# Patient Record
Sex: FEMALE | Race: WHITE | Employment: OTHER | URBAN - NONMETROPOLITAN AREA
[De-identification: names, ages, dates, MRNs, and addresses within clinical notes are randomized per-mention and may not be internally consistent; named-entity substitution may affect disease eponyms.]

---

## 2018-03-01 ENCOUNTER — HOSPITAL ENCOUNTER (OUTPATIENT)
Dept: WOMENS IMAGING | Age: 73
Discharge: HOME | End: 2018-03-01
Payer: MEDICARE

## 2018-03-01 DIAGNOSIS — Z12.31 ENCOUNTER FOR SCREENING MAMMOGRAM FOR MALIGNANT NEOPLASM OF BREAST: ICD-10-CM

## 2018-03-01 DIAGNOSIS — Z12.31 VISIT FOR SCREENING MAMMOGRAM: ICD-10-CM

## 2018-03-01 PROCEDURE — 77063 BREAST TOMOSYNTHESIS BI: CPT

## 2019-03-01 ENCOUNTER — HOSPITAL ENCOUNTER (OUTPATIENT)
Dept: WOMENS IMAGING | Age: 74
Discharge: HOME | End: 2019-03-01
Payer: MEDICARE

## 2019-03-01 DIAGNOSIS — Z12.31 VISIT FOR SCREENING MAMMOGRAM: ICD-10-CM

## 2019-03-01 PROCEDURE — 77063 BREAST TOMOSYNTHESIS BI: CPT

## 2020-03-05 ENCOUNTER — HOSPITAL ENCOUNTER (OUTPATIENT)
Dept: WOMENS IMAGING | Age: 75
Discharge: HOME | End: 2020-03-05
Payer: MEDICARE

## 2020-03-05 PROCEDURE — 77063 BREAST TOMOSYNTHESIS BI: CPT

## 2021-03-08 ENCOUNTER — HOSPITAL ENCOUNTER (OUTPATIENT)
Dept: WOMENS IMAGING | Age: 76
Discharge: HOME | End: 2021-03-08
Payer: MEDICARE

## 2021-03-08 DIAGNOSIS — Z12.31 VISIT FOR SCREENING MAMMOGRAM: ICD-10-CM

## 2021-03-08 PROCEDURE — 77063 BREAST TOMOSYNTHESIS BI: CPT

## 2022-04-18 ENCOUNTER — HOSPITAL ENCOUNTER (OUTPATIENT)
Dept: WOMENS IMAGING | Age: 77
Discharge: HOME | End: 2022-04-18
Payer: MEDICARE

## 2022-04-18 DIAGNOSIS — Z12.31 VISIT FOR SCREENING MAMMOGRAM: ICD-10-CM

## 2022-04-18 PROCEDURE — 77063 BREAST TOMOSYNTHESIS BI: CPT

## 2022-04-20 ENCOUNTER — HOSPITAL ENCOUNTER (OUTPATIENT)
Dept: WOMENS IMAGING | Age: 77
Discharge: HOME | End: 2022-04-20
Payer: MEDICARE

## 2022-04-20 DIAGNOSIS — R92.0 MICROCALCIFICATION OF LEFT BREAST ON MAMMOGRAM: ICD-10-CM

## 2022-04-20 PROCEDURE — G0279 TOMOSYNTHESIS, MAMMO: HCPCS

## 2022-10-24 ENCOUNTER — HOSPITAL ENCOUNTER (OUTPATIENT)
Dept: WOMENS IMAGING | Age: 77
Discharge: HOME | End: 2022-10-24
Payer: MEDICARE

## 2022-10-24 DIAGNOSIS — Z09 FOLLOW-UP EXAM: ICD-10-CM

## 2022-10-24 DIAGNOSIS — R92.0 MICROCALCIFICATION OF LEFT BREAST ON MAMMOGRAM: ICD-10-CM

## 2022-10-24 PROCEDURE — G0279 TOMOSYNTHESIS, MAMMO: HCPCS

## 2022-11-01 ENCOUNTER — HOSPITAL ENCOUNTER (OUTPATIENT)
Dept: WOMENS IMAGING | Age: 77
Discharge: HOME | End: 2022-11-01
Payer: MEDICARE

## 2022-11-01 DIAGNOSIS — R92.1 BREAST CALCIFICATION, LEFT: ICD-10-CM

## 2022-11-01 PROCEDURE — 88342 IMHCHEM/IMCYTCHM 1ST ANTB: CPT

## 2022-11-01 PROCEDURE — A4648 IMPLANTABLE TISSUE MARKER: HCPCS

## 2022-11-01 PROCEDURE — 88305 TISSUE EXAM BY PATHOLOGIST: CPT

## 2022-11-01 PROCEDURE — G0279 TOMOSYNTHESIS, MAMMO: HCPCS

## 2022-11-01 PROCEDURE — 88360 TUMOR IMMUNOHISTOCHEM/MANUAL: CPT

## 2022-11-01 NOTE — PROGRESS NOTES
Breast Biopsy Flowsheet/Post-Operative Care    Date of Procedure: 11/1/2022  Physician: Dr. Amandeep Patel  Technologist: Lorena Bolanos RT(R)(M)    Biopsy:stereotactic breast biopsy  Lesion type: Non-palpable  Breast: left    Clock face position: Site #1: UOQ mid depth          Primary Method of Detection: Mammogram      Microcalcification's: yes,Increasing Number   Distribution: Grouped       Biopsy Method:   Mammotome:    Site # 1    Gauge: 10    # of Passes: 8     Clip: Tribell       Pre-Op Assessment: (BI-RADS)   4. Suspicious Abnormality    Patient Tolerated Procedure: good  Complications: none  Comments: she did  have more than normal bleeding during and immediately after the procedure. Firm pressure with ice was held for 10 minutes, post procedure. I could see some discoloration starting.     Post Operative Care  Steri strips: Yes  Dressing: Gauze, Tape   Ice Applied to Site:  Yes  Evidence of Bleeding:  No    Pain Verbalized: No      Written Discharge Instructions: Yes  Condition at Discharge: good  Time of Discharge: [phone]    Electronically signed by Nicole Vargas on 11/1/2022 at 11:16 AM

## 2022-11-04 ENCOUNTER — TELEPHONE (OUTPATIENT)
Dept: ONCOLOGY | Age: 77
End: 2022-11-04

## 2022-11-04 ENCOUNTER — CLINICAL DOCUMENTATION (OUTPATIENT)
Dept: WOMENS IMAGING | Age: 77
End: 2022-11-04

## 2022-11-04 NOTE — PROGRESS NOTES
Contact Type: [facility]    Diagnosis:  DCIS    Home Disposition: Lives with her     Contact Information: Patient saw her results on [employer], after discussing with Dr. Samara Malik, I called the patient and we went over results. Patient Expresses Need(s) For: patient is aware of what she read, she states she has been through this before and will talk to Dr. Blane Reyes. Requests Referral to Doctor(s) with appointment(s): the patient called back later on Thursday after Dr. Blane Reyes had called her and requested an appointment with Dr. Michelle Ashton. Referral call was made to Rosario at the office and they scheduled Lori for next week with Dr. Michelle Ashton    Additional Referral(s): Sharyle Couch Gerding/Lisa Eddy: [facility],        Integrated breast cancer clinic appointment: n/a    Biopsy site status: bruised and sore    Teaching Sheets provided:  n/a    Currently on HRT: no    If yes, was patient instructed to stop immediately: N/A     Notes:Patient aware that oncology navigators will be calling her.     Answered yes on Genetic Risk Assessment: no     Additional information: established with Dr. Blane Reyes and Dr. Michelle Ashton    Results Faxed to:  Jaison Blunt, Dr. Blane Reyes

## 2022-11-04 NOTE — TELEPHONE ENCOUNTER
Name: Azucena Castro  : 1945  MRN: [id number]    Oncology Navigation Follow-Up Note    Contact Type:  Telephone    Notes: Called patient to discuss left breast cancer diagnosis. Left message and contact information for patient to call me.       Electronically signed by Brendon Talavera RN on 2022 at 1:23 PM

## 2022-11-07 ENCOUNTER — OFFICE VISIT (OUTPATIENT)
Dept: SURGERY | Age: 77
End: 2022-11-07
Payer: MEDICARE

## 2022-11-07 ENCOUNTER — CLINICAL DOCUMENTATION (OUTPATIENT)
Dept: CASE MANAGEMENT | Age: 77
End: 2022-11-07

## 2022-11-07 ENCOUNTER — HOSPITAL ENCOUNTER (OUTPATIENT)
Age: 77
Discharge: HOME | End: 2022-11-07
Payer: MEDICARE

## 2022-11-07 ENCOUNTER — HOSPITAL ENCOUNTER (OUTPATIENT)
Age: 77
Discharge: HOME | End: 2022-11-07

## 2022-11-07 VITALS
HEART RATE: 67 BPM | SYSTOLIC BLOOD PRESSURE: 122 MMHG | WEIGHT: 147 LBS | RESPIRATION RATE: 18 BRPM | TEMPERATURE: 96.8 F | HEIGHT: 68 IN | OXYGEN SATURATION: 98 % | BODY MASS INDEX: 22.28 KG/M2 | DIASTOLIC BLOOD PRESSURE: 70 MMHG

## 2022-11-07 DIAGNOSIS — G47.33 OBSTRUCTIVE SLEEP APNEA SYNDROME: ICD-10-CM

## 2022-11-07 DIAGNOSIS — I10 ESSENTIAL HYPERTENSION: ICD-10-CM

## 2022-11-07 DIAGNOSIS — M85.80 OSTEOPENIA, UNSPECIFIED LOCATION: ICD-10-CM

## 2022-11-07 DIAGNOSIS — D05.12 DUCTAL CARCINOMA IN SITU (DCIS) OF LEFT BREAST: Primary | ICD-10-CM

## 2022-11-07 DIAGNOSIS — Z01.818 PRE-OP TESTING: ICD-10-CM

## 2022-11-07 DIAGNOSIS — D05.12 DUCTAL CARCINOMA IN SITU (DCIS) OF LEFT BREAST: ICD-10-CM

## 2022-11-07 LAB
ALBUMIN SERPL-MCNC: 4.4 G/DL (ref 3.5–5.1)
ALP BLD-CCNC: 83 U/L (ref 38–126)
ALT SERPL-CCNC: 15 U/L (ref 11–66)
ANION GAP SERPL CALCULATED.3IONS-SCNC: 8 MEQ/L (ref 8–16)
AST SERPL-CCNC: 19 U/L (ref 5–40)
BILIRUB SERPL-MCNC: 0.4 MG/DL (ref 0.3–1.2)
BUN BLDV-MCNC: 9 MG/DL (ref 7–22)
CALCIUM SERPL-MCNC: 9.8 MG/DL (ref 8.5–10.5)
CHLORIDE BLD-SCNC: 98 MEQ/L (ref 98–111)
CO2: 29 MEQ/L (ref 23–33)
CREAT SERPL-MCNC: 0.6 MG/DL (ref 0.4–1.2)
ERYTHROCYTE [DISTWIDTH] IN BLOOD BY AUTOMATED COUNT: 12.6 % (ref 11.5–14.5)
ERYTHROCYTE [DISTWIDTH] IN BLOOD BY AUTOMATED COUNT: 43.9 FL (ref 35–45)
GFR SERPL CREATININE-BSD FRML MDRD: > 60 ML/MIN/1.73M2
GLUCOSE BLD-MCNC: 99 MG/DL (ref 70–108)
HCT VFR BLD CALC: 42.2 % (ref 37–47)
HEMOGLOBIN: 14 GM/DL (ref 12–16)
MCH RBC QN AUTO: 31.5 PG (ref 26–33)
MCHC RBC AUTO-ENTMCNC: 33.2 GM/DL (ref 32.2–35.5)
MCV RBC AUTO: 95 FL (ref 81–99)
PLATELET # BLD: 292 THOU/MM3 (ref 130–400)
PMV BLD AUTO: 11.1 FL (ref 9.4–12.4)
POTASSIUM SERPL-SCNC: 3.7 MEQ/L (ref 3.5–5.2)
RBC # BLD: 4.44 MILL/MM3 (ref 4.2–5.4)
SODIUM BLD-SCNC: 135 MEQ/L (ref 135–145)
TOTAL PROTEIN: 6.8 G/DL (ref 6.1–8)
WBC # BLD: 5.4 THOU/MM3 (ref 4.8–10.8)

## 2022-11-07 PROCEDURE — 1090F PRES/ABSN URINE INCON ASSESS: CPT | Performed by: SURGERY

## 2022-11-07 PROCEDURE — G8427 DOCREV CUR MEDS BY ELIG CLIN: HCPCS | Performed by: SURGERY

## 2022-11-07 PROCEDURE — 3074F SYST BP LT 130 MM HG: CPT | Performed by: SURGERY

## 2022-11-07 PROCEDURE — 3078F DIAST BP <80 MM HG: CPT | Performed by: SURGERY

## 2022-11-07 PROCEDURE — 99204 OFFICE O/P NEW MOD 45 MIN: CPT | Performed by: SURGERY

## 2022-11-07 PROCEDURE — G8420 CALC BMI NORM PARAMETERS: HCPCS | Performed by: SURGERY

## 2022-11-07 PROCEDURE — 1036F TOBACCO NON-USER: CPT | Performed by: SURGERY

## 2022-11-07 PROCEDURE — G8400 PT W/DXA NO RESULTS DOC: HCPCS | Performed by: SURGERY

## 2022-11-07 PROCEDURE — 80053 COMPREHEN METABOLIC PANEL: CPT

## 2022-11-07 PROCEDURE — G8484 FLU IMMUNIZE NO ADMIN: HCPCS | Performed by: SURGERY

## 2022-11-07 PROCEDURE — 3017F COLORECTAL CA SCREEN DOC REV: CPT | Performed by: SURGERY

## 2022-11-07 PROCEDURE — 85027 COMPLETE CBC AUTOMATED: CPT

## 2022-11-07 PROCEDURE — 1123F ACP DISCUSS/DSCN MKR DOCD: CPT | Performed by: SURGERY

## 2022-11-07 PROCEDURE — 93005 ELECTROCARDIOGRAM TRACING: CPT

## 2022-11-07 PROCEDURE — 93010 ELECTROCARDIOGRAM REPORT: CPT | Performed by: NUCLEAR MEDICINE

## 2022-11-07 PROCEDURE — 36415 COLL VENOUS BLD VENIPUNCTURE: CPT

## 2022-11-07 RX ORDER — METOPROLOL SUCCINATE 25 MG/1
1 TABLET, EXTENDED RELEASE ORAL DAILY
COMMUNITY
Start: 2021-04-30

## 2022-11-07 RX ORDER — LEVOTHYROXINE SODIUM 0.05 MG/1
TABLET ORAL
COMMUNITY
Start: 2022-10-24

## 2022-11-07 ASSESSMENT — ENCOUNTER SYMPTOMS
ABDOMINAL PAIN: 0
SORE THROAT: 0
TROUBLE SWALLOWING: 0
VOICE CHANGE: 0
BLOOD IN STOOL: 0
COUGH: 0
SHORTNESS OF BREATH: 0
WHEEZING: 0
COLOR CHANGE: 0
VOMITING: 0
NAUSEA: 0

## 2022-11-07 NOTE — PROGRESS NOTES
Name: Benito Gifford  : 1945  MRN: [id number]    Oncology Navigation Follow-Up Note     Contact Type:  Medical Oncology for genetic lab draw    Notes: Blood drawn using Gordon kit and shipped by [employer]. Tracking information scanned under Media tab. [facility] Cancer Genetic Counseling Referral Form, breast cancer path report, cancer related family history, guarantor information  faxed to Sinan Berry. Patient instructed to monitor e-mail from [facility] and set up appointment for genetic counseling.

## 2022-11-07 NOTE — PROGRESS NOTES
Meryle Blade, MD   General Surgery  New Patient Evaluation in Office  Pt Name: Bibiana Romo  Date of Birth 1945   Today's Date: 11/7/2022  Medical Record Number: [id number]  Referring Provider: No ref. provider found  Primary Care Provider: Coleen Hess DO  Chief Complaint:  Chief Complaint   Patient presents with    Surgical Consult     New patient-referred by [facility] breast ductal carcinoma       ASSESSMENT      1. Ductal carcinoma in situ (DCIS) of left breast    2. Essential hypertension    3. Pre-op testing    4. Osteopenia, unspecified location    5. Obstructive sleep apnea syndrome    4. Personal history DCIS of the right breast 2011, hormone responsive     PLANS      Patient  with small focus of DCIS left breast.  Amenable to breast conservation which patient desires. She is now over 77 and may be a candidate to withhold radiation therapy. I would have her see Dr. Ruddy Allen postop to discuss recommendations. Schedule patient for wire localization lumpectomy of the left breast.  Risks of procedure discussed with patient include but not limited to bleeding, infection, need for reexcision of margins as well as recurrent disease despite treatment. Patient expressed understanding all questions answered. MAC versus general anesthesia  Patient with dense breasts. I would recommend an MRI to evaluate both breasts at some point, however, she has significant hematoma in the left breast postbiopsy. Would delay at this time. Patient wants to go ahead and schedule surgery. Recommend genetic testing and counseling. Patient states this would not change her proposed surgical intervention. She would still proceed with breast conservation therapy. Alternative approach with mastectomy with and without reconstruction discussed as well. Patient wishes to proceed with breast conservation. Patient is agreeable to genetic testing as information for her daughter's health going forward.   Medical oncology consultation. Patient sees Dr. [facility] tomorrow for his input as well. He saw him last spring. 7.  Schedule for oncology rehabilitation preoperative evaluation. 8.  Preoperative testing per anesthesia guidelines      Kati Yip is a 78y.o. year old female who is presenting today in the office for evaluation of newly diagnosed ductal carcinoma in situ of the left breast.  Regan Rios has a personal history of breast cancer with ductal carcinoma in situ of the right breast in 2011. She was treated with breast conservation therapy she did require a second surgery with reexcision of margins which were negative. She underwent a course of external beam radiation therapy postoperatively. She had 33 treatments. She was treated in [place]. She saw [facility]. [facility] and took [address] for 5 years. She had bilateral mammography 6 months ago there was a few fine calcifications in the left breast upper outer quadrant which appeared likely benign 6-month follow-up imaging was recommended. She had a few more calcifications on her 6-month imaging and underwent a stereotactic core biopsy. This revealed ductal carcinoma in situ.,  Intermediate grade which was strongly ER and IL positive. She had had no recent breast symptoms. She continues to follow-up with [facility] yearly. Menarche age 17.  G3, P3. She had her child at age 31. She took oral contraceptive medications for several years. She was menopausal in her 45s. She did not take estrogen replacement therapy except for a brief duration of Premarin. No family history of breast cancer. Father history of prostate cancer.     Past Medical History  Past Medical History:   Diagnosis Date    Ductal carcinoma in situ (DCIS) of right breast 09/15/2011    Ductal carcinoma in situ of left breast 11/01/2022    History of therapeutic radiation 2011    Hypertension     Hypothyroidism     Sleep apnea     cpap       Past Surgical History  Past Surgical History: Procedure Laterality Date    BREAST LUMPECTOMY Right 09/15/2011    DCIS    [facility] STEROTACTIC LOC BREAST BIOPSY LEFT Left 11/1/2022    LOIDA STEROTACTIC LOC BREAST BIOPSY LEFT 11/1/2022 Hira Collado MD [facility]       Medications  Current Outpatient Medications   Medication Sig Dispense Refill    metoprolol succinate (TOPROL XL) 25 MG extended release tablet Take 1 tablet by mouth daily      levothyroxine (SYNTHROID) 50 MCG tablet TAKE 1 TABLET BY MOUTH ONCE DAILY       No current facility-administered medications for this visit. Allergies   No Known Allergies    Family History  Family History   Problem Relation Age of Onset    Other Mother         fibrocystic breast    Alzheimer's Disease Mother     Prostate Cancer Father         in his 80's    Diabetes Brother     No Known Problems Maternal Grandmother     No Known Problems Maternal Grandfather     No Known Problems Paternal Grandmother     No Known Problems Paternal Grandfather        SocialHistory  Social History     Socioeconomic History    Marital status:      Spouse name: Not on file    Number of children: Not on file    Years of education: Not on file    Highest education level: Not on file   Occupational History    Not on file   Tobacco Use    Smoking status: Never    Smokeless tobacco: Never   Substance and Sexual Activity    Alcohol use: Not on file    Drug use: Not on file    Sexual activity: Not on file   Other Topics Concern    Not on file   Social History Narrative    Not on file     Social Determinants of Health     Financial Resource Strain: Not on file   Food Insecurity: Not on file   Transportation Needs: Not on file   Physical Activity: Not on file   Stress: Not on file   Social Connections: Not on file   Intimate Partner Violence: Not on file   Housing Stability: Not on file           Review of Systems  Review of Systems   Constitutional:  Negative for chills, fatigue, fever and unexpected weight change.    HENT:  Negative for sore throat, trouble swallowing and voice change. Eyes:  Negative for visual disturbance. Respiratory:  Negative for cough, shortness of breath and wheezing. Cardiovascular:  Negative for chest pain and palpitations. Gastrointestinal:  Negative for abdominal pain, blood in stool, nausea and vomiting. Endocrine: Negative for cold intolerance, heat intolerance and polydipsia. Genitourinary:  Negative for dysuria, flank pain and hematuria. Musculoskeletal:  Negative for gait problem, joint swelling and myalgias. Skin:  Negative for color change and rash. Allergic/Immunologic: Negative for immunocompromised state. Neurological:  Negative for dizziness, tremors, seizures and speech difficulty. Hematological:  Negative for adenopathy. Does not bruise/bleed easily. Psychiatric/Behavioral:  Negative for behavioral problems and confusion. OBJECTIVE     /70 (Site: Left Upper Arm, Position: Sitting, Cuff Size: Medium Adult)   Pulse 67   Temp 96.8 °F (36 °C) (Temporal)   Resp 18   Ht 5' 8\" (1.727 m)   Wt 147 lb (66.7 kg)   LMP 03/01/1996 (Approximate)   SpO2 98%   BMI 22.35 kg/m²      Physical Exam  Constitutional:       Appearance: Normal appearance. She is well-developed. HENT:      Head: Normocephalic and atraumatic. Nose: Nose normal. No congestion or rhinorrhea. Eyes:      General: No scleral icterus. Extraocular Movements: Extraocular movements intact. Pupils: Pupils are equal, round, and reactive to light. Neck:      Vascular: No JVD. Trachea: No tracheal deviation. Cardiovascular:      Rate and Rhythm: Normal rate. Heart sounds: Normal heart sounds. No murmur heard. Pulmonary:      Effort: Pulmonary effort is normal. No respiratory distress. Breath sounds: Normal breath sounds. No wheezing or rales. Chest:      Chest wall: No mass, swelling or tenderness.    Breasts:     Right: No inverted nipple, mass, nipple discharge or skin change. Left: No inverted nipple, mass, nipple discharge or skin change. Abdominal:      General: There is no distension. Palpations: There is no mass. Tenderness: There is no abdominal tenderness. Musculoskeletal:         General: No deformity. Right lower leg: No edema. Left lower leg: No edema. Lymphadenopathy:      Cervical: No cervical adenopathy. Right cervical: No superficial, deep or posterior cervical adenopathy. Left cervical: No superficial, deep or posterior cervical adenopathy. Upper Body:      Right upper body: No supraclavicular, axillary or pectoral adenopathy. Left upper body: No supraclavicular, axillary or pectoral adenopathy. Skin:     General: Skin is warm and dry. Coloration: Skin is not jaundiced or pale. Findings: No rash. Neurological:      General: No focal deficit present. Mental Status: She is alert and oriented to person, place, and time. Cranial Nerves: No cranial nerve deficit. Psychiatric:         Mood and Affect: Mood normal.         Behavior: Behavior normal.       No results found for: WBC, HGB, HCT, PLT, CHOL, TRIG, HDL, LDLDIRECT, ALT, AST, NA, K, CL, CREATININE, BUN, CO2, TSH, PSA, INR, GLUF, LABA1C, LABMICR       Narrative  Performed by: [address] [place] [address]. Pathology      Alexander Armendariz                   [id number]   Assoc.                                               Page 1 of [facility] [place], [address]                                                       PROC: 2022   [facility]                                    RECV: 2022   [address]                                    RPTD: 2022   KARO BINGHAM II.[facility], [address]                       MRN:  [id number]     LOC: [facility]                       ACCT: [de-identified]  SEX: F                       : 1945  AGE: 78 Y                          PATHOLOGY REPORT                       ATTN: eDnnise Botello REQ: Corrie Lombardi       Copies To:   Jaciel Vasques; Alka Osuna; DESTINEE PÉREZ       Clinical Information: LEFT CALCIFICATIONS     FINAL DIAGNOSIS:   A, B. Left breast, upper outer quadrant, tribell clip, biopsy:    Ductal carcinoma in situ, intermediate nuclear grade, cribriform and   solid types,  with microcalcifications. Fibrocystic changes including stromal fibrosis, columnar cell change   and apocrine  metaplasia with associated microcalcifications. Specimen:   A) CORE NEEDLE BREAST BIOPSY, LEFT CALCS \"A\", UOQ, TRIBELL CLIP   B) CORE NEEDLE BREAST BIOPSY, LEFT CALCS \"B\", UOQ, TRIBELL CLIP       Gross Examination:   A - The container is labeled Lori Covarrubias, left breast, upper outer   quadrant, tribell clip, A. Received in formalin are fragments of   yellow-white tissue and blood clot aggregating to 1.7 x 1.2 x 0.3 cm.   1 ns. Fixative:  10% neutral buffered formalin   Tissue removal time:  10:32   Radiology time:  10:33   Tissue fixation time:  10:38   Total fixation time: 9 hours 22 minutes     B - The container is labeled Lori Covarrubias, left breast, upper outer   quadrant, tribell clip, B. Received in formalin are multiple fragments   of yellow-white tissue and blood clot aggregating to 2 x 1.5 x 0.3 cm.   1 ns. MTK/DKR:[id number]     Fixative:  10% neutral buffered formalin   Tissue removal time:  10:32   Radiology time:  10:33   Tissue fixation time:  10:38   Total fixation time: 9 hours 22 minutes     Microscopic Examination:   A, B. Procedure: Needle biopsy   Specimen laterality: Left   Tumor site: Upper outer quadrant   Histologic type: Ductal carcinoma in situ   E-cadherin stain* is positive. The control is adequate.    Architectural patterns: Cribriform,solid   Nuclear grade: Grade II (intermediate)   Necrosis: Not identified   Microcalcifications: Present in DCIS and in nonneoplastic tissue     Estrogen Receptor: (Clone SP1), [employer]       ( x ) Positive  100% of cells (>10% of cells)   Intensity of Staining:       ( x ) Strong     Progesterone Receptor: (Clone 1E2), [employer]       ( x ) Positive 90 % of cells   Intensity of Staining:       ( x ) Strong       External Controls:  (x  )  Adequate    (  ) Inadequate   Internal Controls:  (x  )  Adequate     (  ) No internal control (ER is   0 - <10%). Recommend repeat testing on another specimen. Standard Assay Conditions:  (x  )  Met   (  ) Not Met (Cold ischemic   time>1 hr., and/or fixation time<6 or>72 hrs. for hormone receptors). Staining Method Used:    Formalin fixation    Antigen retrieval type:  Cell Conditioning 1, mild adan    Time in antigen retrieval:  30 minutes    Detection system type:   DAB Ultraview kit       *This test was developed and its performance characteristics determined   by [facility]. It has not been cleared or   approved by the U.S. Food and Drug Administration. Pursuant to the   requirements of CLIA, this laboratory has established and verified the   test's accuracy and precision. Additional information about this type   of test is available upon request.     88305 x 2   88360 x 2                                                       <Sign Out Dr. Mode M.D., F.C. A. P       [facility]/ [facility]  Printed on:  11/2/2022   Cherie Romero [address]   [facility] II.[place], [address] [facility] DIGITAL SCREEN SELF REFERRAL W OR WO CAD BILATERAL (Order [id number])  Narrative   LOCATION: [place]       PROCEDURE: [facility] DIGITAL SCREEN SELF REFERRAL W OR WO CAD BILATERAL       CLINICAL INFORMATION: Visit for screening mammogram. Tomosynthesis. PATIENT MEDICAL HISTORY: Medical history includes breast cancer. FAMILY HISTORY: No relevant family history has been documented for this patient.        RISK VALUES: Tyrer-Cuzick 10yr.: NA%, Tyrer-Cuzick life: NA%       COMPARISON: 3/8/2021, 3/5/2020, 3/1/2019, 3/1/2018, 2/27/2017       TECHNIQUE: Bilateral CC and MLO views of the breasts were obtained. 3D tomosynthesis was utilized. CAD was utilized. BREAST COMPOSITION: The tissue of the breast(s) is extremely dense, which lowers the sensitivity of mammography. FINDINGS:        There are benign-appearing scattered calcifications in both breasts. Postoperative findings are noted within the right breast.       There is a possible small cluster of punctate calcifications within the upper outer left breast anterior depth. Recommend further evaluation with magnification views of the left breast as well as a left lateral medial view. Impression   1. There is a possible small cluster of punctate calcifications within the upper outer left breast anterior depth. Recommend further evaluation with magnification views of the left breast as well as a left lateral medial view. The patient will be contacted by our department per protocol regarding additional imaging and scheduling. BI-RADS CATEGORY 0: INCOMPLETE - NEED ADDITIONAL IMAGING EVALUATION AND/OR PRIOR MAMMOGRAMS FOR COMPARISON. Management Recommendation: Recall for additional imaging. The patient will be contacted by our department for additional imaging/scheduling. **This report has been created using voice recognition software. It may contain minor errors which are inherent in voice recognition technology. **       Final report electronically signed by Dr. Latonya Baxter on 4/18/2022 5:02 PM            [facility] BRITTANY DIGITAL DIAGNOSTIC UNILATERAL LEFT (Order [id number]) - Reflex for Order [id number]  Narrative   LOCATION: [place]       PROCEDURE: [facility]ANY DIGITAL DIAGNOSTIC UNILATERAL LEFT       CLINICAL INFORMATION: Microcalcification of left breast on mammogram . Tomosynthesis. PATIENT MEDICAL HISTORY: Medical history includes breast cancer.        FAMILY HISTORY: No relevant family history has been documented for this patient. RISK VALUES: Daniel-Maura 10yr.: n/a%, Daniel-Maura life: n/a%       COMPARISON: 4/18/2022, 3/8/2021, 3/5/2020, 3/1/2019, 3/1/2018       TECHNIQUE: Images of the left include CC, MLO and lateral medial magnification views as well as a left lateral medial view. Tomosynthesis and CAD were utilized. BREAST COMPOSITION: The tissue of the breast(s) is heterogeneously dense. This may lower the sensitivity of mammography. FINDINGS:        There are grouped punctate calcifications demonstrated within the upper outer left breast middle to anterior depth. These appear punctate and round and probably benign. Recommend 6 month follow-up left breast diagnostic mammogram to document stability. Impression   There are grouped punctate calcifications demonstrated within the upper outer left breast middle to anterior depth. These appear punctate and round and probably benign. Recommend 6 month follow-up left breast diagnostic mammogram to document stability. The patient was notified and a result letter will be sent to the patient. She will also receive a reminder 1 month prior to her follow-up exam.               BI-RADS CATEGORY 3: PROBABLY BENIGN FINDING. Management Recommendation: Short interval follow-up or continued surveillance mammography. **This report has been created using voice recognition software. It may contain minor errors which are inherent in voice recognition technology. **       Final report electronically signed by Dr. Jeyson Roblero on 4/20/2022 11:07 AM            LOCATION: [place]       PROCEDURE: [facility] DIGITAL DIAGNOSTIC UNILATERAL LEFT       CLINICAL INFORMATION: Follow-up exam, Microcalcification of left breast on    mammogram . Tomosynthesis. Six-month follow-up left breast    calcifications. Teetee Ryan CLINICAL:     Diagnostic    PATIENT MEDICAL HISTORY:  Medical history includes breast cancer.    FAMILY HISTORY: No relevant family history has been documented for this    patient. RISK VALUES: Tyrer-Cuzick 10yr.: na%, [facility] life:   na%       COMPARISON: 4/20/2022, 4/18/2022, 3/8/2021 and 3/5/2020. TECHNIQUE: Images of the left include CC, MLO and LM views. Magnification    CC, MLO and LM views were also obtained . Tomosynthesis and CAD were    utilized. BREAST COMPOSITION: The tissue of the breast(s) is extremely dense, which    lowers the sensitivity of mammography. FINDINGS:        There is a 5 mm cluster of calcifications in the upper outer left breast,    middle depth. There are 2 new calcifications associated with this cluster. There are other scattered stable benign-appearing calcifications. No other    significant masses, calcifications, or other findings are seen in the    breast(s). Impression   5 mm cluster calcifications in the upper outer left breast. A stereotactic    guided biopsy is recommended. These results were discussed with the patient. The tech navigator was    notified. We will arrange scheduling the patient for her procedure per    department protocol. BI-RADS CATEGORY 4B - Suspicious abnormality - Intermediate suspicion for    malignancy. Management: Tissue diagnosis. Biopsy should be performed in the absence    of clinical contraindication. **This report has been created using voice recognition software. It may    contain minor errors which are inherent in voice recognition technology. **       Final report electronically signed by Dr. Ian Walton on 10/24/2022    11:29 AM               [facility] BRITTANY DIGITAL DIAGNOSTIC UNILATERAL LEFT (Order [id number]) - Reflex for Order [id number]  Narrative   LOCATION: [place]       PROCEDURE: [facility] BR[facility]ANY DIGITAL DIAGNOSTIC UNILATERAL LEFT       CLINICAL INFORMATION: Microcalcification of left breast on mammogram . Tomosynthesis.         PATIENT MEDICAL HISTORY: Medical history includes breast cancer. FAMILY HISTORY: No relevant family history has been documented for this patient. RISK VALUES: Daniel-Maura 10yr.: n/a%, Daniel-Maura life: n/a%       COMPARISON: 4/18/2022, 3/8/2021, 3/5/2020, 3/1/2019, 3/1/2018       TECHNIQUE: Images of the left include CC, MLO and lateral medial magnification views as well as a left lateral medial view. Tomosynthesis and CAD were utilized. BREAST COMPOSITION: The tissue of the breast(s) is heterogeneously dense. This may lower the sensitivity of mammography. FINDINGS:        There are grouped punctate calcifications demonstrated within the upper outer left breast middle to anterior depth. These appear punctate and round and probably benign. Recommend 6 month follow-up left breast diagnostic mammogram to document stability. Impression   There are grouped punctate calcifications demonstrated within the upper outer left breast middle to anterior depth. These appear punctate and round and probably benign. Recommend 6 month follow-up left breast diagnostic mammogram to document stability. The patient was notified and a result letter will be sent to the patient. She will also receive a reminder 1 month prior to her follow-up exam.               BI-RADS CATEGORY 3: PROBABLY BENIGN FINDING. Management Recommendation: Short interval follow-up or continued surveillance mammography. **This report has been created using voice recognition software. It may contain minor errors which are inherent in voice recognition technology. **       Final report electronically signed by Dr. Andre Bergman on 4/20/2022 11:07 AM         * ADDENDUM #1 *       PATHOLOGY ADDENDUM * PATHOLOGY ADDENDUM        Final pathology of ductal carcinoma in situ is concordant with imaging    findings. The patient will be discussed in the interdisciplinary breast    conference.        Patient is currently managed by her oncologist and surgical consultation    will be obtained. The patient was notified of these results. Final report electronically signed by Dr. Yakelin Navarro on 11/4/2022 12:43    AM       ORIGINAL REPORT **   LOCATION: [place]       EXAM:     1. Biopsy of the left breast, percutaneous, vacuum assisted biopsy device,    using imaging guidance. 2. Stereotactic guidance with tomosynthesis for biopsy, imaging    supervision and interpretation. 3. Radiologic examination, biopsy specimen. 4. Image guided placement, localization clip, percutaneous, during breast    biopsy. 5. Postprocedural diagnostic left breast mammogram.           HISTORY: 77 years, Female, Breast calcification, left. .       COMPARISON:  10/24/2022, 4/20/2022, 4/18/2022, 3/8/2021, 3/5/2020,    3/1/2019, 3/1/2018       TECHNIQUE/FINDINGS:        Written, informed consent was obtained, including discussion of the risks,    benefits and alternatives. The patient's left breast was marked by the    physician with initials. A timeout was performed including the patient's    name, date of birth, procedure and    laterality. The calcifications was/were identified and localized with stereotactic    guidance from a lateral approach. The area was prepped and draped in    sterile fashion. 2% lidocaine was used for local anesthesia. A small    dermatotomy was made. Tomosynthesis was used for the  images. The 10-gauge Mammotome    vacuum-assisted device was advanced under computer guidance to a pre-fire    position and stereo pairs were obtained to confirm position. Once the post    fire position was documented to be in    the correct location, 8 samples were taken. The calcifications of interest are within the specimen with confirmation    by specimen radiography. Specimens were placed in formalin and sent to    surgical pathology.        A [employer] biopsy marker was placed and position confirmed with a left    breast digital diagnostic mammogram. The mammogram was performed as a    separate procedure in a separate room with a dedicated machine. The patient tolerated the procedure well. No evidence of immediate    complication. The patient was given post-procedure instructions, including    wound care. POSTPROCEDURE MAMMOGRAM:       Images of the left include a CC view and LM view. Tomosynthesis. CAD was    utilized. The tissue of the breast(s) is heterogeneously dense. This may lower the    sensitivity of mammography. Post procedure mammograms were taken in a    separate room. There is a tribell biopsy clip at the biopsy site within    the left breast middle depth upper    outer region. Partial removal of the calcifications is documented. There are no other significant findings in the breast.             Narrative   LOCATION: [place]       EXAM:     1. Biopsy of the left breast, percutaneous, vacuum assisted biopsy device,        using imaging guidance. 2. Stereotactic guidance with tomosynthesis for biopsy, imaging    supervision and interpretation. 3. Radiologic examination, biopsy specimen. 4. Image guided placement, localization clip, percutaneous, during breast    biopsy. 5. Postprocedural diagnostic left breast mammogram.           HISTORY: 77 years, Female, Breast calcification, left. .       COMPARISON:  10/24/2022, 4/20/2022, 4/18/2022, 3/8/2021, 3/5/2020,    3/1/2019, 3/1/2018       TECHNIQUE/FINDINGS:        Written, informed consent was obtained, including discussion of the risks,        benefits and alternatives. The patient's left breast was marked by the    physician with initials. A timeout was performed including the patient's    name, date of birth, procedure and    laterality. The calcifications was/were identified and localized with stereotactic    guidance from a lateral approach. The area was prepped and draped in    sterile fashion.  2% lidocaine was used for local anesthesia. A small    dermatotomy was made. Tomosynthesis was used for the  images. The 10-gauge Mammotome    vacuum-assisted device was advanced under computer guidance to a pre-fire    position and stereo pairs were obtained to confirm position. Once the post        fire position was documented to be in    the correct location, 8 samples were taken. The calcifications of interest are within the specimen with confirmation    by specimen radiography. Specimens were placed in formalin and sent to    surgical pathology. A tribell biopsy marker was placed and position confirmed with a left    breast digital diagnostic mammogram. The mammogram was performed as a    separate procedure in a separate room with a dedicated machine. The patient tolerated the procedure well. No evidence of immediate    complication. The patient was given post-procedure instructions, including        wound care. POSTPROCEDURE MAMMOGRAM:       Images of the left include a CC view and LM view. Tomosynthesis. CAD was    utilized. The tissue of the breast(s) is heterogeneously dense. This may lower the    sensitivity of mammography. Post procedure mammograms were taken in a    separate room. There is a tribell biopsy clip at the biopsy site within    the left breast middle depth upper    outer region. Partial removal of the calcifications is documented. There are no other significant findings in the breast.               Impression   1. Successful left breast stereotactic, vacuum assisted core needle biopsy    of calcifications located within the outer slightly upper quadrant middle    depth. 2. Successful tribell marker clip placement with confirmation by digital    diagnostic mammogram.    3. The imaged cores include the calcifications. Waiting for pathology results. A final report will be issued when these    become available.            BI-RADS Final Assessment Category: Waiting for pathology. Management Recommendation: Assess radiologic/pathologic concordance. **This report has been created using voice recognition software. It may    contain minor errors which are inherent in voice recognition technology. **       Final report electronically signed by Dr. Vanesa Chery on 11/1/2022 12:02    PM       Imaging independently reviewed.

## 2022-11-09 DIAGNOSIS — D05.12 DUCTAL CARCINOMA IN SITU (DCIS) OF LEFT BREAST: ICD-10-CM

## 2022-11-09 LAB
EKG ATRIAL RATE: 64 BPM
EKG P AXIS: 76 DEGREES
EKG P-R INTERVAL: 166 MS
EKG Q-T INTERVAL: 416 MS
EKG QRS DURATION: 80 MS
EKG QTC CALCULATION (BAZETT): 429 MS
EKG R AXIS: 10 DEGREES
EKG T AXIS: 57 DEGREES
EKG VENTRICULAR RATE: 64 BPM

## 2022-11-18 ENCOUNTER — HOSPITAL ENCOUNTER (OUTPATIENT)
Age: 77
Setting detail: OUTPATIENT SURGERY
Discharge: HOME | End: 2022-11-18
Attending: SURGERY | Admitting: SURGERY
Payer: MEDICARE

## 2022-11-18 ENCOUNTER — HOSPITAL ENCOUNTER (OUTPATIENT)
Dept: WOMENS IMAGING | Age: 77
Discharge: HOME | End: 2022-11-18
Payer: MEDICARE

## 2022-11-18 ENCOUNTER — ANESTHESIA EVENT (OUTPATIENT)
Dept: OPERATING ROOM | Age: 77
End: 2022-11-18
Payer: MEDICARE

## 2022-11-18 ENCOUNTER — ANESTHESIA (OUTPATIENT)
Dept: OPERATING ROOM | Age: 77
End: 2022-11-18
Payer: MEDICARE

## 2022-11-18 VITALS
OXYGEN SATURATION: 97 % | DIASTOLIC BLOOD PRESSURE: 71 MMHG | WEIGHT: 146.8 LBS | SYSTOLIC BLOOD PRESSURE: 144 MMHG | HEIGHT: 68 IN | RESPIRATION RATE: 16 BRPM | BODY MASS INDEX: 22.25 KG/M2 | TEMPERATURE: 97.3 F | HEART RATE: 71 BPM

## 2022-11-18 DIAGNOSIS — D05.12 DUCTAL CARCINOMA IN SITU (DCIS) OF LEFT BREAST: ICD-10-CM

## 2022-11-18 LAB
Lab: NEGATIVE
Lab: NORMAL

## 2022-11-18 PROCEDURE — 6360000002 HC RX W HCPCS: Performed by: SURGERY

## 2022-11-18 PROCEDURE — 7100000011 HC PHASE II RECOVERY - ADDTL 15 MIN: Performed by: SURGERY

## 2022-11-18 PROCEDURE — 7100000010 HC PHASE II RECOVERY - FIRST 15 MIN: Performed by: SURGERY

## 2022-11-18 PROCEDURE — 3600000002 HC SURGERY LEVEL 2 BASE: Performed by: SURGERY

## 2022-11-18 PROCEDURE — 77065 DX MAMMO INCL CAD UNI: CPT

## 2022-11-18 PROCEDURE — 3700000001 HC ADD 15 MINUTES (ANESTHESIA): Performed by: SURGERY

## 2022-11-18 PROCEDURE — 19301 PARTIAL MASTECTOMY: CPT | Performed by: SURGERY

## 2022-11-18 PROCEDURE — 2709999900 US PLACE BREAST LOC DEVICE 1ST LESION LEFT

## 2022-11-18 PROCEDURE — 88307 TISSUE EXAM BY PATHOLOGIST: CPT

## 2022-11-18 PROCEDURE — 2709999900 HC NON-CHARGEABLE SUPPLY: Performed by: SURGERY

## 2022-11-18 PROCEDURE — 2500000003 HC RX 250 WO HCPCS: Performed by: SURGERY

## 2022-11-18 PROCEDURE — 76098 X-RAY EXAM SURGICAL SPECIMEN: CPT

## 2022-11-18 PROCEDURE — 3600000012 HC SURGERY LEVEL 2 ADDTL 15MIN: Performed by: SURGERY

## 2022-11-18 PROCEDURE — 2580000003 HC RX 258: Performed by: SURGERY

## 2022-11-18 PROCEDURE — 3700000000 HC ANESTHESIA ATTENDED CARE: Performed by: SURGERY

## 2022-11-18 PROCEDURE — 6360000002 HC RX W HCPCS: Performed by: NURSE ANESTHETIST, CERTIFIED REGISTERED

## 2022-11-18 RX ORDER — MORPHINE SULFATE 2 MG/ML
4 INJECTION, SOLUTION INTRAMUSCULAR; INTRAVENOUS
Status: DISCONTINUED | OUTPATIENT
Start: 2022-11-18 | End: 2022-11-18 | Stop reason: HOSPADM

## 2022-11-18 RX ORDER — SODIUM CHLORIDE 9 MG/ML
INJECTION, SOLUTION INTRAVENOUS PRN
Status: DISCONTINUED | OUTPATIENT
Start: 2022-11-18 | End: 2022-11-18 | Stop reason: HOSPADM

## 2022-11-18 RX ORDER — MORPHINE SULFATE 2 MG/ML
2 INJECTION, SOLUTION INTRAMUSCULAR; INTRAVENOUS
Status: DISCONTINUED | OUTPATIENT
Start: 2022-11-18 | End: 2022-11-18 | Stop reason: HOSPADM

## 2022-11-18 RX ORDER — BUPIVACAINE HYDROCHLORIDE 2.5 MG/ML
INJECTION, SOLUTION EPIDURAL; INFILTRATION; INTRACAUDAL PRN
Status: DISCONTINUED | OUTPATIENT
Start: 2022-11-18 | End: 2022-11-18 | Stop reason: ALTCHOICE

## 2022-11-18 RX ORDER — SODIUM CHLORIDE 0.9 % (FLUSH) 0.9 %
5-40 SYRINGE (ML) INJECTION EVERY 12 HOURS SCHEDULED
Status: DISCONTINUED | OUTPATIENT
Start: 2022-11-18 | End: 2022-11-18 | Stop reason: HOSPADM

## 2022-11-18 RX ORDER — ONDANSETRON 2 MG/ML
4 INJECTION INTRAMUSCULAR; INTRAVENOUS EVERY 6 HOURS PRN
Status: DISCONTINUED | OUTPATIENT
Start: 2022-11-18 | End: 2022-11-18 | Stop reason: HOSPADM

## 2022-11-18 RX ORDER — SODIUM CHLORIDE 9 MG/ML
INJECTION, SOLUTION INTRAVENOUS CONTINUOUS
Status: DISCONTINUED | OUTPATIENT
Start: 2022-11-18 | End: 2022-11-18 | Stop reason: HOSPADM

## 2022-11-18 RX ORDER — HYDROCODONE BITARTRATE AND ACETAMINOPHEN 5; 325 MG/1; MG/1
2 TABLET ORAL EVERY 4 HOURS PRN
Status: DISCONTINUED | OUTPATIENT
Start: 2022-11-18 | End: 2022-11-18 | Stop reason: HOSPADM

## 2022-11-18 RX ORDER — PROPOFOL 10 MG/ML
INJECTION, EMULSION INTRAVENOUS CONTINUOUS PRN
Status: DISCONTINUED | OUTPATIENT
Start: 2022-11-18 | End: 2022-11-18 | Stop reason: SDUPTHER

## 2022-11-18 RX ORDER — PROPOFOL 10 MG/ML
INJECTION, EMULSION INTRAVENOUS PRN
Status: DISCONTINUED | OUTPATIENT
Start: 2022-11-18 | End: 2022-11-18 | Stop reason: SDUPTHER

## 2022-11-18 RX ORDER — LIDOCAINE HYDROCHLORIDE AND EPINEPHRINE 20; 5 MG/ML; UG/ML
INJECTION, SOLUTION EPIDURAL; INFILTRATION; INTRACAUDAL; PERINEURAL PRN
Status: DISCONTINUED | OUTPATIENT
Start: 2022-11-18 | End: 2022-11-18 | Stop reason: ALTCHOICE

## 2022-11-18 RX ORDER — SODIUM CHLORIDE 0.9 % (FLUSH) 0.9 %
5-40 SYRINGE (ML) INJECTION PRN
Status: DISCONTINUED | OUTPATIENT
Start: 2022-11-18 | End: 2022-11-18 | Stop reason: HOSPADM

## 2022-11-18 RX ORDER — ONDANSETRON 4 MG/1
4 TABLET, ORALLY DISINTEGRATING ORAL EVERY 8 HOURS PRN
Status: DISCONTINUED | OUTPATIENT
Start: 2022-11-18 | End: 2022-11-18 | Stop reason: HOSPADM

## 2022-11-18 RX ORDER — HYDROCODONE BITARTRATE AND ACETAMINOPHEN 5; 325 MG/1; MG/1
1 TABLET ORAL EVERY 4 HOURS PRN
Status: DISCONTINUED | OUTPATIENT
Start: 2022-11-18 | End: 2022-11-18 | Stop reason: HOSPADM

## 2022-11-18 RX ORDER — HYDROCODONE BITARTRATE AND ACETAMINOPHEN 5; 325 MG/1; MG/1
1 TABLET ORAL EVERY 4 HOURS PRN
Qty: 18 TABLET | Refills: 0 | Status: SHIPPED | OUTPATIENT
Start: 2022-11-18 | End: 2022-11-21

## 2022-11-18 RX ORDER — ACETAMINOPHEN 325 MG/1
650 TABLET ORAL EVERY 4 HOURS PRN
Status: DISCONTINUED | OUTPATIENT
Start: 2022-11-18 | End: 2022-11-18 | Stop reason: HOSPADM

## 2022-11-18 RX ORDER — FENTANYL CITRATE 50 UG/ML
INJECTION, SOLUTION INTRAMUSCULAR; INTRAVENOUS PRN
Status: DISCONTINUED | OUTPATIENT
Start: 2022-11-18 | End: 2022-11-18 | Stop reason: SDUPTHER

## 2022-11-18 RX ORDER — MIDAZOLAM HYDROCHLORIDE 1 MG/ML
INJECTION INTRAMUSCULAR; INTRAVENOUS PRN
Status: DISCONTINUED | OUTPATIENT
Start: 2022-11-18 | End: 2022-11-18 | Stop reason: SDUPTHER

## 2022-11-18 RX ADMIN — SODIUM CHLORIDE: 9 INJECTION, SOLUTION INTRAVENOUS at 10:04

## 2022-11-18 RX ADMIN — PROPOFOL 50 MG: 10 INJECTION, EMULSION INTRAVENOUS at 10:10

## 2022-11-18 RX ADMIN — MIDAZOLAM 2 MG: 1 INJECTION INTRAMUSCULAR; INTRAVENOUS at 10:05

## 2022-11-18 RX ADMIN — CEFAZOLIN 2000 MG: 10 INJECTION, POWDER, FOR SOLUTION INTRAVENOUS at 10:11

## 2022-11-18 RX ADMIN — FENTANYL CITRATE 100 MCG: 50 INJECTION, SOLUTION INTRAMUSCULAR; INTRAVENOUS at 10:07

## 2022-11-18 RX ADMIN — PROPOFOL 100 MCG/KG/MIN: 10 INJECTION, EMULSION INTRAVENOUS at 10:10

## 2022-11-18 ASSESSMENT — PAIN - FUNCTIONAL ASSESSMENT: PAIN_FUNCTIONAL_ASSESSMENT: 0-10

## 2022-11-18 NOTE — H&P
[address] [address]  History and Physical Update    Pt Name: Marleny Frank  MRN: [id number]  YOB: 1945  Date of evaluation: 11/18/2022    [x] I have examined the patient and reviewed the H&P/Consult and there are no changes to the patient or plans. [] I have examined the patient and reviewed the H&P/Consult and have noted the following changes:        Corky Carver MD MD  Electronically signed 11/18/2022 at 9:53 Shamar Azul MD   General Surgery  New Patient Evaluation in Office  Pt Name: Marleny Frank  Date of Birth 1945   Today's Date: 11/7/2022  Medical Record Number: [id number]  Referring Provider: No ref. provider found  Primary Care Provider: Philip Wilson DO  Chief Complaint:       Chief Complaint   Patient presents with    Surgical Consult       New patient-referred by [facility] breast ductal carcinoma         ASSESSMENT   1. Ductal carcinoma in situ (DCIS) of left breast    2. Essential hypertension    3. Pre-op testing    4. Osteopenia, unspecified location    5. Obstructive sleep apnea syndrome    4. Personal history DCIS of the right breast 2011, hormone responsive  PLANS   Patient  with small focus of DCIS left breast.  Amenable to breast conservation which patient desires. She is now over 77 and may be a candidate to withhold radiation therapy. I would have her see Dr. Merrick Rodriguez postop to discuss recommendations. Schedule patient for wire localization lumpectomy of the left breast.  Risks of procedure discussed with patient include but not limited to bleeding, infection, need for reexcision of margins as well as recurrent disease despite treatment. Patient expressed understanding all questions answered. MAC versus general anesthesia  Patient with dense breasts. I would recommend an MRI to evaluate both breasts at some point, however, she has significant hematoma in the left breast postbiopsy. Would delay at this time.   Patient wants to go ahead and schedule surgery. Recommend genetic testing and counseling. Patient states this would not change her proposed surgical intervention. She would still proceed with breast conservation therapy. Alternative approach with mastectomy with and without reconstruction discussed as well. Patient wishes to proceed with breast conservation. Patient is agreeable to genetic testing as information for her daughter's health going forward. Medical oncology consultation. Patient sees Dr. Alan Morejon tomorrow for his input as well. He saw him last spring. 7.  Schedule for oncology rehabilitation preoperative evaluation. 8.  Preoperative testing per anesthesia guidelines      Augustina Tejeda is a 78y.o. year old female who is presenting today in the office for evaluation of newly diagnosed ductal carcinoma in situ of the left breast.  Patricia Guillermo has a personal history of breast cancer with ductal carcinoma in situ of the right breast in 2011. She was treated with breast conservation therapy she did require a second surgery with reexcision of margins which were negative. She underwent a course of external beam radiation therapy postoperatively. She had 33 treatments. She was treated in [place]. She saw Dr. Alan Morejon and took [address] for 5 years. She had bilateral mammography 6 months ago there was a few fine calcifications in the left breast upper outer quadrant which appeared likely benign 6-month follow-up imaging was recommended. She had a few more calcifications on her 6-month imaging and underwent a stereotactic core biopsy. This revealed ductal carcinoma in situ.,  Intermediate grade which was strongly ER and [place] positive. She had had no recent breast symptoms. She continues to follow-up with Dr. Alan Morejon yearly. Menarche age 17.  G3, P3. She had her child at age 31. She took oral contraceptive medications for several years. She was menopausal in her 45s.   She did not take estrogen replacement therapy except for a brief duration of Premarin. No family history of breast cancer. Father history of prostate cancer. Past Medical History  Past Medical History        Past Medical History:   Diagnosis Date    Ductal carcinoma in situ (DCIS) of right breast 09/15/2011    Ductal carcinoma in situ of left breast 11/01/2022    History of therapeutic radiation 2011    Hypertension      Hypothyroidism      Sleep apnea       cpap          Past Surgical History  Past Surgical History         Past Surgical History:   Procedure Laterality Date    BREAST LUMPECTOMY Right 09/15/2011     DCIS    LOIDA STEROTACTIC LOC BREAST BIOPSY LEFT Left 11/1/2022     LOIDA STEROTACTIC LOC BREAST BIOPSY LEFT 11/1/2022 Amor Elizabeth MD [facility]          Medications  Current Facility-Administered Medications          Current Outpatient Medications   Medication Sig Dispense Refill    metoprolol succinate (TOPROL XL) 25 MG extended release tablet Take 1 tablet by mouth daily        levothyroxine (SYNTHROID) 50 MCG tablet TAKE 1 TABLET BY MOUTH ONCE DAILY          No current facility-administered medications for this visit.          Allergies   No Known Allergies    Family History  Family History         Family History   Problem Relation Age of Onset    Other Mother           fibrocystic breast    Alzheimer's Disease Mother      Prostate Cancer Father           in his 80's    Diabetes Brother      No Known Problems Maternal Grandmother      No Known Problems Maternal Grandfather      No Known Problems Paternal Grandmother      No Known Problems Paternal Grandfather            SocialHistory  Social History               Socioeconomic History    Marital status:        Spouse name: Not on file    Number of children: Not on file    Years of education: Not on file    Highest education level: Not on file   Occupational History    Not on file   Tobacco Use    Smoking status: Never    Smokeless tobacco: Never   Substance and Sexual Activity Alcohol use: Not on file    Drug use: Not on file    Sexual activity: Not on file   Other Topics Concern    Not on file   Social History Narrative    Not on file      Social Determinants of Health      Financial Resource Strain: Not on file   Food Insecurity: Not on file   Transportation Needs: Not on file   Physical Activity: Not on file   Stress: Not on file   Social Connections: Not on file   Intimate Partner Violence: Not on file   Housing Stability: Not on file              Review of Systems  Review of Systems   Constitutional:  Negative for chills, fatigue, fever and unexpected weight change. HENT:  Negative for sore throat, trouble swallowing and voice change. Eyes:  Negative for visual disturbance. Respiratory:  Negative for cough, shortness of breath and wheezing. Cardiovascular:  Negative for chest pain and palpitations. Gastrointestinal:  Negative for abdominal pain, blood in stool, nausea and vomiting. Endocrine: Negative for cold intolerance, heat intolerance and polydipsia. Genitourinary:  Negative for dysuria, flank pain and hematuria. Musculoskeletal:  Negative for gait problem, joint swelling and myalgias. Skin:  Negative for color change and rash. Allergic/Immunologic: Negative for immunocompromised state. Neurological:  Negative for dizziness, tremors, seizures and speech difficulty. Hematological:  Negative for adenopathy. Does not bruise/bleed easily. Psychiatric/Behavioral:  Negative for behavioral problems and confusion. OBJECTIVE      /70 (Site: Left Upper Arm, Position: Sitting, Cuff Size: Medium Adult)   Pulse 67   Temp 96.8 °F (36 °C) (Temporal)   Resp 18   Ht 5' 8\" (1.727 m)   Wt 147 lb (66.7 kg)   LMP 03/01/1996 (Approximate)   SpO2 98%   BMI 22.35 kg/m²       Physical Exam  Constitutional:       Appearance: Normal appearance. She is well-developed. HENT:      Head: Normocephalic and atraumatic.       Nose: Nose normal. No congestion or rhinorrhea. Eyes:      General: No scleral icterus. Extraocular Movements: Extraocular movements intact. Pupils: Pupils are equal, round, and reactive to light. Neck:      Vascular: No JVD. Trachea: No tracheal deviation. Cardiovascular:      Rate and Rhythm: Normal rate. Heart sounds: Normal heart sounds. No murmur heard. Pulmonary:      Effort: Pulmonary effort is normal. No respiratory distress. Breath sounds: Normal breath sounds. No wheezing or rales. Chest:      Chest wall: No mass, swelling or tenderness. Breasts:     Right: No inverted nipple, mass, nipple discharge or skin change. Left: No inverted nipple, mass, nipple discharge or skin change. Abdominal:      General: There is no distension. Palpations: There is no mass. Tenderness: There is no abdominal tenderness. Musculoskeletal:         General: No deformity. Right lower leg: No edema. Left lower leg: No edema. Lymphadenopathy:      Cervical: No cervical adenopathy. Right cervical: No superficial, deep or posterior cervical adenopathy. Left cervical: No superficial, deep or posterior cervical adenopathy. Upper Body:      Right upper body: No supraclavicular, axillary or pectoral adenopathy. Left upper body: No supraclavicular, axillary or pectoral adenopathy. Skin:     General: Skin is warm and dry. Coloration: Skin is not jaundiced or pale. Findings: No rash. Neurological:      General: No focal deficit present. Mental Status: She is alert and oriented to person, place, and time. Cranial Nerves: No cranial nerve deficit.    Psychiatric:         Mood and Affect: Mood normal.         Behavior: Behavior normal.         No results found for: WBC, HGB, HCT, PLT, CHOL, TRIG, HDL, LDLDIRECT, ALT, AST, NA, K, CL, CREATININE, BUN, CO2, TSH, PSA, INR, GLUF, LABA1C, LABMICR         Narrative  Performed by: [address] [place] [address]. Pathology Celeste Beltrán                   [phone]   [address], [address]                                                       PROC: 2022   [facility]                                    RECV: 2022   [address] [employer]                                    RPTD: 2022   KARO PACHECO AM DELOLI CLAUDIA, [address]                       MRN:  [id number]     LOC:                        ACCT: [de-identified]  SEX: F                       : 1945  AGE: 78 Y                          PATHOLOGY REPORT                       ATTN: WILBER ESPINOSA                       REQ: Cecily Hansen       Copies To:   Althea Johnson; DESTINEE PÉREZ       Clinical Information: LEFT CALCIFICATIONS     FINAL DIAGNOSIS:   A, B. Left breast, upper outer quadrant, tribell clip, biopsy:    Ductal carcinoma in situ, intermediate nuclear grade, cribriform and   solid types,  with microcalcifications. Fibrocystic changes including stromal fibrosis, columnar cell change   and apocrine  metaplasia with associated microcalcifications. Specimen:   A) CORE NEEDLE BREAST BIOPSY, LEFT CALCS \"A\", UOQ, TRIBELL CLIP   B) CORE NEEDLE BREAST BIOPSY, LEFT CALCS \"B\", UOQ, TRIBELL CLIP       Gross Examination:   A - The container is labeled Lori Covarrubias, left breast, upper outer   quadrant, tribell clip, A. Received in formalin are fragments of   yellow-white tissue and blood clot aggregating to 1.7 x 1.2 x 0.3 cm.   1 ns. Fixative:  10% neutral buffered formalin   Tissue removal time:  10:32   Radiology time:  10:33   Tissue fixation time:  10:38   Total fixation time: 9 hours 22 minutes     B - The container is labeled Lori Covarrubias, left breast, upper outer   quadrant, tribell clip, B. Received in formalin are multiple fragments   of yellow-white tissue and blood clot aggregating to 2 x 1.5 x 0.3 cm.   1 ns.   MTK/DKR:[id number]     Fixative:  10% neutral buffered formalin Tissue removal time:  10:32   Radiology time:  10:33   Tissue fixation time:  10:38   Total fixation time: 9 hours 22 minutes     Microscopic Examination:   A, B. Procedure: Needle biopsy   Specimen laterality: Left   Tumor site: Upper outer quadrant   Histologic type: Ductal carcinoma in situ   E-cadherin stain* is positive. The control is adequate. Architectural patterns: Cribriform,solid   Nuclear grade: Grade II (intermediate)   Necrosis: Not identified   Microcalcifications: Present in DCIS and in nonneoplastic tissue     Estrogen Receptor: (Clone SP1), [id number]       ( x ) Positive  100% of cells (>10% of cells)   Intensity of Staining:       ( x ) Strong     Progesterone Receptor: (Clone 1E2), [employer]       ( x ) Positive 90 % of cells   Intensity of Staining:       ( x ) Strong       External Controls:  (x  )  Adequate    (  ) Inadequate   Internal Controls:  (x  )  Adequate     (  ) No internal control (ER is   0 - <10%). Recommend repeat testing on another specimen. Standard Assay Conditions:  (x  )  Met   (  ) Not Met (Cold ischemic   time>1 hr., and/or fixation time<6 or>72 hrs. for hormone receptors). Staining Method Used:    Formalin fixation    Antigen retrieval type:  Cell Conditioning 1, mild adan    Time in antigen retrieval:  30 minutes    Detection system type:   DAB Ultraview kit       *This test was developed and its performance characteristics determined   by [address] [place] [address] Laboratory. It has not been cleared or   approved by the U.S. Food and Drug Administration. Pursuant to the   requirements of CLIA, this laboratory has established and verified the   test's accuracy and precision. Additional information about this type   of test is available upon request.     88305 x 2   88360 x 2                                                       <Sign Out Dr. Edilma Holden M.D., F.C. A. P [facility]/ [address] [address]  Printed on:  11/2/2022   Cherie Romero [address]   [place], [address]                   [facility] DIGITAL SCREEN SELF REFERRAL W OR WO CAD BILATERAL (Order [id number])  Narrative   LOCATION: [place]       PROCEDURE: [facility]ANY DIGITAL SCREEN SELF REFERRAL W OR WO CAD BILATERAL       CLINICAL INFORMATION: Visit for screening mammogram. Tomosynthesis. PATIENT MEDICAL HISTORY: Medical history includes breast cancer. FAMILY HISTORY: No relevant family history has been documented for this patient. RISK VALUES: Tyrer-Cuzick 10yr.: NA%, Tyrer-Cuzick life: NA%       COMPARISON: 3/8/2021, 3/5/2020, 3/1/2019, 3/1/2018, 2/27/2017       TECHNIQUE: Bilateral CC and MLO views of the breasts were obtained. 3D tomosynthesis was utilized. CAD was utilized. BREAST COMPOSITION: The tissue of the breast(s) is extremely dense, which lowers the sensitivity of mammography. FINDINGS:        There are benign-appearing scattered calcifications in both breasts. Postoperative findings are noted within the right breast.       There is a possible small cluster of punctate calcifications within the upper outer left breast anterior depth. Recommend further evaluation with magnification views of the left breast as well as a left lateral medial view. Impression   1. There is a possible small cluster of punctate calcifications within the upper outer left breast anterior depth. Recommend further evaluation with magnification views of the left breast as well as a left lateral medial view. The patient will be contacted by our department per protocol regarding additional imaging and scheduling. BI-RADS CATEGORY 0: INCOMPLETE - NEED ADDITIONAL IMAGING EVALUATION AND/OR PRIOR MAMMOGRAMS FOR COMPARISON. Management Recommendation: Recall for additional imaging. The patient will be contacted by our department for additional imaging/scheduling.                **This report has been created using voice recognition software. It may contain minor errors which are inherent in voice recognition technology. **       Final report electronically signed by Dr. Yakelin Navarro on 4/18/2022 5:02 PM              [facility] BRITTANY DIGITAL DIAGNOSTIC UNILATERAL LEFT (Order [id number]) - Reflex for Order [id number]  Narrative   LOCATION: [place]       PROCEDURE: LOIDA [facility] DIGITAL DIAGNOSTIC UNILATERAL LEFT       CLINICAL INFORMATION: Microcalcification of left breast on mammogram . Tomosynthesis. PATIENT MEDICAL HISTORY: Medical history includes breast cancer. FAMILY HISTORY: No relevant family history has been documented for this patient. RISK VALUES: Tyrer-Cuzick 10yr.: n/a%, Tyrer-Cuzick life: n/a%       COMPARISON: 4/18/2022, 3/8/2021, 3/5/2020, 3/1/2019, 3/1/2018       TECHNIQUE: Images of the left include CC, MLO and lateral medial magnification views as well as a left lateral medial view. Tomosynthesis and CAD were utilized. BREAST COMPOSITION: The tissue of the breast(s) is heterogeneously dense. This may lower the sensitivity of mammography. FINDINGS:        There are grouped punctate calcifications demonstrated within the upper outer left breast middle to anterior depth. These appear punctate and round and probably benign. Recommend 6 month follow-up left breast diagnostic mammogram to document stability. Impression   There are grouped punctate calcifications demonstrated within the upper outer left breast middle to anterior depth. These appear punctate and round and probably benign. Recommend 6 month follow-up left breast diagnostic mammogram to document stability. The patient was notified and a result letter will be sent to the patient. She will also receive a reminder 1 month prior to her follow-up exam.               BI-RADS CATEGORY 3: PROBABLY BENIGN FINDING.        Management Recommendation: Short interval follow-up or continued surveillance mammography. **This report has been created using voice recognition software. It may contain minor errors which are inherent in voice recognition technology. **       Final report electronically signed by Dr. Susannah Closs on 4/20/2022 11:07 AM              LOCATION: [place]       PROCEDURE: LO[facility] DIGITAL DIAGNOSTIC UNILATERAL LEFT       CLINICAL INFORMATION: Follow-up exam, Microcalcification of left breast on    mammogram . Tomosynthesis. Six-month follow-up left breast    calcifications. Charito Gomez CLINICAL:     Diagnostic    PATIENT MEDICAL HISTORY:  Medical history includes breast cancer. FAMILY HISTORY:   No relevant family history has been documented for this    patient. RISK VALUES: Tyrer-Cuzick 10yr.: na%, Tyrer-Cuzick life:   na%       COMPARISON: 4/20/2022, 4/18/2022, 3/8/2021 and 3/5/2020. TECHNIQUE: Images of the left include CC, MLO and LM views. Magnification    CC, MLO and LM views were also obtained . Tomosynthesis and CAD were    utilized. BREAST COMPOSITION: The tissue of the breast(s) is extremely dense, which    lowers the sensitivity of mammography. FINDINGS:        There is a 5 mm cluster of calcifications in the upper outer left breast,    middle depth. There are 2 new calcifications associated with this cluster. There are other scattered stable benign-appearing calcifications. No other    significant masses, calcifications, or other findings are seen in the    breast(s). Impression   5 mm cluster calcifications in the upper outer left breast. A stereotactic    guided biopsy is recommended. These results were discussed with the patient. The tech navigator was    notified. We will arrange scheduling the patient for her procedure per    department protocol. BI-RADS CATEGORY 4B - Suspicious abnormality - Intermediate suspicion for    malignancy. Management: Tissue diagnosis.   Biopsy should be performed in the absence of clinical contraindication. **This report has been created using voice recognition software. It may    contain minor errors which are inherent in voice recognition technology. **       Final report electronically signed by Dr. Jose Marcelino on 10/24/2022    11:29 AM               [facility] BR[facility]ANY DIGITAL DIAGNOSTIC UNILATERAL LEFT (Order [id number]) - Reflex for Order [id number]  Narrative   LOCATION: [place]       PROCEDURE: LOIDA [facility] DIGITAL DIAGNOSTIC UNILATERAL LEFT       CLINICAL INFORMATION: Microcalcification of left breast on mammogram . Tomosynthesis. PATIENT MEDICAL HISTORY: Medical history includes breast cancer. FAMILY HISTORY: No relevant family history has been documented for this patient. RISK VALUES: Tyrer-Cuzick 10yr.: n/a%, Tyrer-Cuzick life: n/a%       COMPARISON: 4/18/2022, 3/8/2021, 3/5/2020, 3/1/2019, 3/1/2018       TECHNIQUE: Images of the left include CC, MLO and lateral medial magnification views as well as a left lateral medial view. Tomosynthesis and CAD were utilized. BREAST COMPOSITION: The tissue of the breast(s) is heterogeneously dense. This may lower the sensitivity of mammography. FINDINGS:        There are grouped punctate calcifications demonstrated within the upper outer left breast middle to anterior depth. These appear punctate and round and probably benign. Recommend 6 month follow-up left breast diagnostic mammogram to document stability. Impression   There are grouped punctate calcifications demonstrated within the upper outer left breast middle to anterior depth. These appear punctate and round and probably benign. Recommend 6 month follow-up left breast diagnostic mammogram to document stability. The patient was notified and a result letter will be sent to the patient. She will also receive a reminder 1 month prior to her follow-up exam.               BI-RADS CATEGORY 3: PROBABLY BENIGN FINDING.        Management Recommendation: Short interval follow-up or continued surveillance mammography. **This report has been created using voice recognition software. It may contain minor errors which are inherent in voice recognition technology. **       Final report electronically signed by Dr. Andre Bergman on 4/20/2022 11:07 AM          * ADDENDUM #1 *       PATHOLOGY ADDENDUM * PATHOLOGY ADDENDUM        Final pathology of ductal carcinoma in situ is concordant with imaging    findings. The patient will be discussed in the interdisciplinary breast    conference. Patient is currently managed by her oncologist and surgical consultation    will be obtained. The patient was notified of these results. Final report electronically signed by Dr. Andre Bergman on 11/4/2022 12:43    AM       ORIGINAL REPORT **   LOCATION: [place]       EXAM:     1. Biopsy of the left breast, percutaneous, vacuum assisted biopsy device,    using imaging guidance. 2. Stereotactic guidance with tomosynthesis for biopsy, imaging    supervision and interpretation. 3. Radiologic examination, biopsy specimen. 4. Image guided placement, localization clip, percutaneous, during breast    biopsy. 5. Postprocedural diagnostic left breast mammogram.           HISTORY: 77 years, Female, Breast calcification, left. .       COMPARISON:  10/24/2022, 4/20/2022, 4/18/2022, 3/8/2021, 3/5/2020,    3/1/2019, 3/1/2018       TECHNIQUE/FINDINGS:        Written, informed consent was obtained, including discussion of the risks,    benefits and alternatives. The patient's left breast was marked by the    physician with initials. A timeout was performed including the patient's    name, date of birth, procedure and    laterality. The calcifications was/were identified and localized with stereotactic    guidance from a lateral approach. The area was prepped and draped in    sterile fashion. 2% lidocaine was used for local anesthesia. A small    dermatotomy was made. Tomosynthesis was used for the  images. The 10-gauge Mammotome    vacuum-assisted device was advanced under computer guidance to a pre-fire    position and stereo pairs were obtained to confirm position. Once the post    fire position was documented to be in    the correct location, 8 samples were taken. The calcifications of interest are within the specimen with confirmation    by specimen radiography. Specimens were placed in formalin and sent to    surgical pathology. A tribell biopsy marker was placed and position confirmed with a left    breast digital diagnostic mammogram. The mammogram was performed as a    separate procedure in a separate room with a dedicated machine. The patient tolerated the procedure well. No evidence of immediate    complication. The patient was given post-procedure instructions, including    wound care. POSTPROCEDURE MAMMOGRAM:       Images of the left include a CC view and LM view. Tomosynthesis. CAD was    utilized. The tissue of the breast(s) is heterogeneously dense. This may lower the    sensitivity of mammography. Post procedure mammograms were taken in a    separate room. There is a tribell biopsy clip at the biopsy site within    the left breast middle depth upper    outer region. Partial removal of the calcifications is documented. There are no other significant findings in the breast.              Narrative   LOCATION: [facility]       EXAM:     1. Biopsy of the left breast, percutaneous, vacuum assisted biopsy device,        using imaging guidance. 2. Stereotactic guidance with tomosynthesis for biopsy, imaging    supervision and interpretation. 3. Radiologic examination, biopsy specimen. 4. Image guided placement, localization clip, percutaneous, during breast    biopsy.    5. Postprocedural diagnostic left breast mammogram.           HISTORY: 77 years, Female, Breast calcification, outer region. Partial removal of the calcifications is documented. There are no other significant findings in the breast.               Impression   1. Successful left breast stereotactic, vacuum assisted core needle biopsy    of calcifications located within the outer slightly upper quadrant middle    depth. 2. Successful tribell marker clip placement with confirmation by digital    diagnostic mammogram.    3. The imaged cores include the calcifications. Waiting for pathology results. A final report will be issued when these    become available. BI-RADS Final Assessment Category: Waiting for pathology. Management Recommendation: Assess radiologic/pathologic concordance. **This report has been created using voice recognition software. It may    contain minor errors which are inherent in voice recognition technology. **       Final report electronically signed by Dr. Yakelin Navarro on 11/1/2022 12:02    PM

## 2022-11-18 NOTE — OP NOTE
[facility]  Operative Report    PATIENT NAME: Azucena Castro  MEDICAL RECORD NO. 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 Clip came out of the hematoma cavity mid aspect of specimen. Visual confirmation of clip by myself. COMPLICATIONS:  None; patient tolerated the procedure well. DRAINS: none  DISPOSITION: Outpatient Surgery  CONDITION: stable      Narrative: Indications patient is a 79-year-old female who had a small focus of left breast DCIS diagnosed with calcifications in the left breast.SHe had previous right breast DCIS which was treated in 2011 with breast conservation therapy. She opted again for breast conservation therapy. Declined genetic testing or counseling at this time. This DCIS on the left was intermediate grade strongly ER and [place] positive. She had previously taken Femara for 5 years. Procedure: After needle localization procedure was performed in the [facility]. She was brought to the operating suite in the surgery center. She was given antibiotic intravenously. After sedation was given the left breast and wire were prepped and draped in usual sterile fashion. Timeout was performed. Skin incision was made medial to the wire which exited the lateral aspect of the left breast.  She did have a hematoma.   Dissection was carried directly beneath the skin and around the wire to the hematoma cavity came up quite superficial.  Additional skin cranial margin was taken. Wide margins were taken around the wire. The actual clip came out of the hematoma separate from the specimen. I had personally identified it visually. Specimen was oriented with margin map markers. Hemostasis was achieved electrocautery. Some clips were placed to crista the lumpectomy cavity. Dermis was closed with interrupted 3-0 Vicryl suture. Skin was closed with running subcuticular 4-0 Monocryl suture. Skin glue was applied.

## 2022-11-18 NOTE — DISCHARGE INSTRUCTIONS
DR Lion Bath DISCHARGE INSTRUCTIONS    Pt Name: Everardo Barker Record Number: [id number]  Today's Date: 11/18/2022    GENERAL ANESTHESIA OR SEDATION  1. Do not drive or operate hazardous machinery for 24 hours. 2. Do not make important business or personal decisions for 24 hours. 3. Do not drink alcoholic beverages or use tobacco for 24 hours. ACTIVITY INSTRUCTIONS:  [] Rest today. Resume light to normal activity tomorrow. [x] You may resume normal activity tomorrow. Do not engage in strenuous activity that may place stress on your incision. [] Do not drive for 3-5 days and avoid heavy lifting, tugging, pullings greater than 10-20 lbs until seen in the office. DIET INSTRUCTIONS:  []Begin with clear liquids. If not nauseated, may increase to a low-fat diet when you desire. Greasy and spicy foods are not advised. [x]Regular diet as tolerated. []Other:     MEDICATIONS  [x]Prescription sent with you to be used as directed. []Lortab   [x]Norco   []Percocet   []Tylenol #3   []Oxycontin   Do not drink alcohol or drive while taking these medications. You may experience dizziness or drowsiness with these medications. You may also experience constipation which can be relieved with stool softners or laxatives. [x]You may resume your daily prescription medication schedule unless otherwise specified. [x]Do not take 325mg Aspirin or other blood thinners such as Coumadin or Plavix for 5 days. WOUND/DRESSING INSTRUCTIONS:  Always ensure you and your care giver clean hands before and after caring for the wound. [] Keep dressing clean and dry for 48 hours. Change when soiled or wet. [] Allow steri-strips to fall off on their own.   [] Ice operative site for 20 minutes 4 times a day. [x] May wash over incision in shower in 24-48 hours, but do not soak in a bath.  [] Take sitz bath for 20 minutes twice daily and after bowel movements. [] Keep the abdominal binder in place during the day.  May remove to shower and at night.  [] Remove packing from wound in 24 hours and replace with AMD dressings daily. [] Empty DIANE drain daily and record the amounts. BREAST PROCEDURES  [x]Following a breast procedure, it is important to continue to where supportive garments. []Following a sentinal lymph node biopsy, you should not be alarmed if your urine has a blue color to it. This is your body eliminating the dye used for the procedure. ABDOMINAL/LAPAROSCOPIC SURGERY  []You are encouraged to get up and move around as this helps with the circulation and speeds up the healing process. []Breath deeply and cough from time to time. This helps to clear your lungs and helps prevent pneumonia. []Supporting your incision with a pillow or your hand helps to minimize discomfort and pain. []Laparoscopic patients may develop shoulder pain in the first 48 hours from the gas used during the procedure. FOLLOW-UP CARE. SPECIFICALLY WATCH FOR:   Fever over 101 degrees by mouth   Increased redness, warmth, hardness at operative site. Blood soaked dressing (small amounts of oozing may be normal.)   Increased or progressive drainage from the surgical area   Inability to urinate or blood in the urine   Pain not relieved by the medications ordered   Persistent nausea and/or vomiting, unable to retain fluids. FOLLOW-UP APPOINTMENT   []1 week   [x]2 weeks   []Other    Call my office if you have any problem that concerns you [phone]. After hours, you can reach the answering service via the office phone number. IF YOU NEED IMMEDIATE ATTENTION, GO TO THE EMERGENCY ROOM AND YOUR DOCTOR WILL BE CONTACTED. Rosa Julian MD MD  [address] [place] [id number]  [facility], [address]  Electronically signed 11/18/2022 at 11:06 AM

## 2022-11-18 NOTE — PROGRESS NOTES
1104-  Report received from [facility]. Patient drowsy, but responds to name and follows commands. Surgical site clean and dry-- closed with surgical glue. Patient denies pain/N/V--will monitor. IV infusing 0.9-- no complications. 1107-  Snack and drink given to patient. Family in room. 1120-  All belongings in room. 1130-  Patient doing well. IV removed-- no complications. Bandage applied. 1140-  Discharge instructions given. Understanding verbalized. 1150-  Patient discharged in stable condition with all belongings. RN walked patient to car.

## 2022-11-18 NOTE — ANESTHESIA PRE PROCEDURE
Department of Anesthesiology  Preprocedure Note       Name:  Lilliana Abraham   Age:  78 y.o.  :  1945                                          MRN:  [id number]         Date:  2022      Surgeon: Mahendra Hopson):  Sandra Leary MD    Procedure: Procedure(s):  LEFT BREAST LUMPECTOMY, PREOP NEEDLE LOC    Medications prior to admission:   Prior to Admission medications    Medication Sig Start Date End Date Taking? Authorizing Provider   metoprolol succinate (TOPROL XL) 25 MG extended release tablet Take 1 tablet by mouth daily 21   Historical Provider, MD   levothyroxine (SYNTHROID) 50 MCG tablet TAKE 1 TABLET BY MOUTH ONCE DAILY 10/24/22   Historical Provider, MD       Current medications:    Current Facility-Administered Medications   Medication Dose Route Frequency Provider Last Rate Last Admin    0.9 % sodium chloride infusion   IntraVENous Continuous Sandra Leary MD        sodium chloride flush 0.9 % injection 5-40 mL  5-40 mL IntraVENous 2 times per day Sandra Leary MD        sodium chloride flush 0.9 % injection 5-40 mL  5-40 mL IntraVENous PRN Sandra Leary MD        0.9 % sodium chloride infusion   IntraVENous PRN Sandra Leary MD        ceFAZolin (ANCEF) 2000 mg in dextrose 5 % 50 mL IVPB  2,000 mg IntraVENous On Call to [address] [place] [address], MD           Allergies: Allergies   Allergen Reactions    Dust Mite Extract Cough    Molds & Smuts Cough       Problem List:  There is no problem list on file for this patient.       Past Medical History:        Diagnosis Date    Ductal carcinoma in situ (DCIS) of right breast 09/15/2011    Ductal carcinoma in situ of left breast 2022    History of therapeutic radiation     Hypertension     Hypothyroidism     Sleep apnea     cpap       Past Surgical History:        Procedure Laterality Date    BREAST LUMPECTOMY Right 09/15/2011    DCIS    LOIDA STEROTACTIC LOC BREAST BIOPSY LEFT Left 2022    LOIDA STEROTACTIC LOC BREAST BIOPSY LEFT 2022 Dannie Lilly MD [address] [place] [address]       Social History:    Social History     Tobacco Use    Smoking status: Never    Smokeless tobacco: Never   Substance Use Topics    Alcohol use: Yes     Comment: rarely                                Counseling given: Not Answered      Vital Signs (Current):   Vitals:    11/18/22 0919   Pulse: 68   Resp: 16   Temp: (!) 96.7 °F (35.9 °C)   TempSrc: Temporal   SpO2: 97%   Weight: 146 lb 12.8 oz (66.6 kg)   Height: 5' 8\" (1.727 m)                                              BP Readings from Last 3 Encounters:   11/07/22 122/70       NPO Status: Time of last liquid consumption: 0645 (sip of water with med)                        Time of last solid consumption: 2000                        Date of last liquid consumption: 11/18/22                        Date of last solid food consumption: 11/17/22    BMI:   Wt Readings from Last 3 Encounters:   11/18/22 146 lb 12.8 oz (66.6 kg)   11/07/22 147 lb (66.7 kg)     Body mass index is 22.32 kg/m². CBC:   Lab Results   Component Value Date/Time    WBC 5.4 11/07/2022 12:38 PM    RBC 4.44 11/07/2022 12:38 PM    HGB 14.0 11/07/2022 12:38 PM    HCT 42.2 11/07/2022 12:38 PM    MCV 95.0 11/07/2022 12:38 PM     11/07/2022 12:38 PM       CMP:   Lab Results   Component Value Date/Time     11/07/2022 12:38 PM    K 3.7 11/07/2022 12:38 PM    CL 98 11/07/2022 12:38 PM    CO2 29 11/07/2022 12:38 PM    BUN 9 11/07/2022 12:38 PM    CREATININE 0.6 11/07/2022 12:38 PM    LABGLOM >60 11/07/2022 12:01 PM    GLUCOSE 99 11/07/2022 12:38 PM    PROT 6.8 11/07/2022 12:38 PM    CALCIUM 9.8 11/07/2022 12:38 PM    BILITOT 0.4 11/07/2022 12:38 PM    ALKPHOS 83 11/07/2022 12:38 PM    AST 19 11/07/2022 12:38 PM    ALT 15 11/07/2022 12:38 PM       POC Tests: No results for input(s): POCGLU, POCNA, POCK, POCCL, POCBUN, POCHEMO, POCHCT in the last 72 hours.     Coags: No results found for: PROTIME, INR, APTT    HCG (If Applicable): No results found for: PREGTESTUR, PREGSERUM, HCG, HCGQUANT     ABGs: No results found for: PHART, PO2ART, ERS2JZZ, PDS6FBT, BEART, U5QWRIEX     Type & Screen (If Applicable):  No results found for: LABABO, LABRH    Drug/Infectious Status (If Applicable):  No results found for: HIV, HEPCAB    COVID-19 Screening (If Applicable): No results found for: COVID19        Anesthesia Evaluation  Patient summary reviewed  Airway: Mallampati: III  TM distance: >3 FB   Neck ROM: full  Mouth opening: > = 3 FB   Dental:          Pulmonary:   (+) sleep apnea:                             Cardiovascular:    (+) hypertension:,                   Neuro/Psych:               GI/Hepatic/Renal:             Endo/Other:                     Abdominal:             Vascular: Other Findings:           Anesthesia Plan      MAC     ASA 2       Induction: intravenous. Anesthetic plan and risks discussed with patient. Plan discussed with RADHA Mark.  [facility]   11/18/2022

## 2022-11-21 ENCOUNTER — CLINICAL DOCUMENTATION (OUTPATIENT)
Dept: ONCOLOGY | Age: 77
End: 2022-11-21

## 2022-11-21 NOTE — PROGRESS NOTES
Name: Cam Pratt  : 1945  MRN: [id number]    Oncology Navigation Follow-Up Note    Notes: Aleks Agosto results faxed to Dr. Elpidio Coffey per patient request.    Electronically signed by Wilman Oliveira RN on 2022 at 8:24 AM

## 2022-11-22 ENCOUNTER — TELEPHONE (OUTPATIENT)
Dept: SURGERY | Age: 77
End: 2022-11-22

## 2022-11-28 ENCOUNTER — OFFICE VISIT (OUTPATIENT)
Dept: SURGERY | Age: 77
End: 2022-11-28

## 2022-11-28 VITALS
SYSTOLIC BLOOD PRESSURE: 140 MMHG | TEMPERATURE: 97.6 F | WEIGHT: 146 LBS | HEART RATE: 67 BPM | OXYGEN SATURATION: 97 % | BODY MASS INDEX: 22.13 KG/M2 | DIASTOLIC BLOOD PRESSURE: 80 MMHG | HEIGHT: 68 IN

## 2022-11-28 DIAGNOSIS — D05.12 DUCTAL CARCINOMA IN SITU (DCIS) OF LEFT BREAST: Primary | ICD-10-CM

## 2022-11-28 DIAGNOSIS — Z09 POSTOP CHECK: ICD-10-CM

## 2022-11-28 PROCEDURE — 99024 POSTOP FOLLOW-UP VISIT: CPT | Performed by: SURGERY

## 2022-11-28 NOTE — PROGRESS NOTES
Angelica Hackett MD  General Surgery  Postprocedure Evaluation in Office  Pt Name: Vasiliy Massey  Date of Birth 1945   Today's Date: 11/28/2022  Medical Record Number: [id number]  Primary Care Provider: Monica Pérez DO  Chief Complaint   Patient presents with    Post-Op Check     S/P  Left partial mastectomy with preoperative wire localization 11/18/22     ASSESSMENT      1. Ductal carcinoma in situ (DCIS) of left breast    2. Postop check         PLANS       Pathology reviewed with the patient who understands. All questions were answered. Follow up: Return in about 6 months (around 5/28/2023). 3.  Negative genetic testing    4. Patient sees Dr. Kathie Knox this week. She did not want to schedule radiation oncology consultation until she discusses with Dr. Kathie Knox. She wants to consider withholding radiation therapy. Grace Gonzalez is seen today for post-op follow-up. She is status post lumpectomy for ductal carcinoma in situ. 2 mm residual disease margins negative. No wound issues. Bruising resolving from core biopsy. No additional surgical bruising. No seroma. Medications    Current Outpatient Medications:     metoprolol succinate (TOPROL XL) 25 MG extended release tablet, Take 1 tablet by mouth daily, Disp: , Rfl:     levothyroxine (SYNTHROID) 50 MCG tablet, TAKE 1 TABLET BY MOUTH ONCE DAILY, Disp: , Rfl:   Allergies    Allergies   Allergen Reactions    Dust Mite Extract Cough    Molds & Smuts Cough       Review of Systems  History obtained from the patient. Constitutional: Denies any fever, chills, fatigue. Wound: Denies any rash, skin color changes or wound problems. Resp: Denies any cough, shortness of breath. CV: Denies any chest pain, orthopnea or syncope. GI: Denies any nausea, vomiting, blood in the stool, constipation or diarrhea.      OBJECTIVE     VITALS: BP (!) 140/80 (Site: Right Upper Arm, Position: Sitting, Cuff Size: Medium Adult)   Pulse 67   Temp 97.6 °F (36.4 °C)   Ht 5' 8\" (1.727 m)   Wt 146 lb (66.2 kg)   LMP 1996 (Approximate)   SpO2 97%   BMI 22.20 kg/m²     CONSTITUTIONAL: Alert and oriented times 3, no acute distress and cooperative to examination. SKIN: Skin color, texture, turgor normal. No rashes or lesions. INCISION: wound margins intact and healing well. No signs of infection. No drainage. NECK: Soft, trachea midline and straight  BREAST: Lumpectomy left No evidence of seroma or hematoma. Performed by: Katheryn Disla. Pathology      [place], [place]                   [phone]   [facility] [address]                                                       PROC: 2022   [facility]                                    RECV: 2022   [address]                                    RPTD: 2022   [facility], [address]                       MRN:  [id number]     LOC: [facility]                       ACCT: [de-identified]  SEX: F                       : 1945  AGE: 78 Y                          PATHOLOGY REPORT                       ATTN: HILARIO MAYSudeep CAMPOS       Copies To:   Dimitris Ruiz       Clinical Information: DUCTAL CARCINOMA IN SITU (DCIS) OF LEFT BREAST     FINAL DIAGNOSIS:   A. Left breast, lumpectomy:             Ductal carcinoma in situ (DCIS), intermediate grade. Margins widely free of DCIS. Pathologic stage: pTis(DCIS). Fibrocystic changes with microcalcifications. Previous biopsy site changes. B.  Left breast, additional cranial, skin margin, excision:             Previous biopsy site changes. Negative for malignancy.        Specimen:   A) EXCISION OF BREAST, LEFT TISSUE   B) EXCISION OF BREAST, ADDITIONAL CRANIAL SKIN, LEFT       Gross Examination:   A - The container is labeled Lori Covarrubias, left breast.  Received in   formalin is a lumpectomy specimen containing a needle-localization wire   measuring 5.5 x 4.5 x 2.5 cm. The specimen contains clips designating   skin, cranial, and lateral margins. The grid indicates calcifications   at D-E/3-4. There is brown discoloration and fragmentation and   possible hematoma corresponding to this area on the specimen. Sectioning reveals a 2 cm hematoma cavity. There are no distinct   masses or suspicious lesions otherwise. Sections are submitted as   follows:  Cassette designation:  A1 - lateral; A2 - cranial; A3 -   caudal; A4 - medial; A5 and A6 - skin and deep (skin-blue, deep-black). Fixative:  10% neutral buffered formalin   Tissue removal time:  10:30   Tissue fixation time:   11:57   Total fixation time: 32 hours 3 minutes     B - The container is labeled Lori Covarrubias, additional cranial, skin,   left breast.  Received in formalin is a fragment of adipose tissue   measuring 2.5 x 1.9 x 0.5 cm. The side of the tissue containing a   stitch is inked blue. The specimen is serially sectioned and entirely   submitted in one cassette. PCF:[id number]     Fixative:  10% neutral buffered formalin   Tissue removal time:  10:35   Tissue fixation time:   10:37   Total fixation time: 33 hours 23 minutes     Microscopic Examination:   A-B.  Reporting Template   Protocol Posting Date: June 2021   CASE SUMMARY: (DCIS OF THE BREAST: Resection)   Standard(s): AJCC-UICC 8     Procedure   Excision (less than total mastectomy)     Specimen Laterality   Left     Tumor Site   Upper outer quadrant     Histologic Type   Ductal carcinoma in situ     Size (Extent) of DCIS   Estimated size (extent) of DCIS is at least in Millimeters (mm): 2 mm     Architectural Patterns   Cribriform   Solid     Nuclear Grade   Grade II (intermediate)     Necrosis   Not identified     Microcalcifications   Present in nonneoplastic tissue     Margin Status   All margins negative for DCIS     Distance from DCIS to Closest Margin   Exact distance: 2 mm     Closest Margin(s) to DCIS   Caudal (on primary excision, not including additional tissue submitted,   2nd nearest margin is deep and is 4 mm from DCIS). Regional Lymph Node Status   Not applicable (no regional lymph nodes submitted or found)     PATHOLOGIC STAGE CLASSIFICATION (pTNM, AJCC 8th Edition)     TNM Descriptors   Not applicable     pT Category   pTis (DCIS): Ductal carcinoma in situ     Regional Lymph Nodes Modifier   Not applicable     pN Category   pN not assigned (no nodes submitted or found)     Breast Biomarker Testing Performed on Previous Biopsy     Estrogen Receptor (ER) Status   Positive   Percentage of Cells with Nuclear Positivity   100%     Progesterone Receptor (PgR) Status   Positive   Percentage of Cells with Nuclear Positivity   90%     Testing Performed on Case Number: [id number]     [id number] [id number]                                                       <Sign Out Dr. Annalisa Chacon M.D., F.C.A.P.        [facility]/ [place]  Printed on:  11/21/2022   Cherie Romero [address]   [facility], [facility]   Original print date: 11/21/2022      Specimen Collected: 11/18/22 12:24 EST Last Resulted: 11/21/22 14

## 2023-01-17 ENCOUNTER — TELEPHONE (OUTPATIENT)
Dept: SPIRITUAL SERVICES | Facility: CLINIC | Age: 78
End: 2023-01-17

## 2023-02-28 ENCOUNTER — TELEPHONE (OUTPATIENT)
Dept: SPIRITUAL SERVICES | Facility: CLINIC | Age: 78
End: 2023-02-28

## 2023-02-28 NOTE — TELEPHONE ENCOUNTER
As a referral from the nurse navigator at the Cancer center, this  called Lori to let her know of our services. No one answered the phone, and a message was left on her answering machine to let her know that she is supported with spiritual care. this is the second call made. Our phone number is given if the patient would want further support.

## 2023-04-19 ENCOUNTER — HOSPITAL ENCOUNTER (OUTPATIENT)
Dept: WOMENS IMAGING | Age: 78
Discharge: HOME | End: 2023-04-19
Payer: MEDICARE

## 2023-04-19 DIAGNOSIS — Z12.31 VISIT FOR SCREENING MAMMOGRAM: ICD-10-CM

## 2023-04-19 PROCEDURE — 77063 BREAST TOMOSYNTHESIS BI: CPT

## 2023-05-22 RX ORDER — ANASTROZOLE 1 MG/1
1 TABLET ORAL DAILY
COMMUNITY
Start: 2023-03-03

## 2023-05-25 ENCOUNTER — OFFICE VISIT (OUTPATIENT)
Dept: SURGERY | Age: 78
End: 2023-05-25
Payer: MEDICARE

## 2023-05-25 VITALS
RESPIRATION RATE: 18 BRPM | WEIGHT: 149 LBS | DIASTOLIC BLOOD PRESSURE: 80 MMHG | TEMPERATURE: 97.1 F | HEART RATE: 65 BPM | HEIGHT: 68 IN | SYSTOLIC BLOOD PRESSURE: 142 MMHG | BODY MASS INDEX: 22.58 KG/M2 | OXYGEN SATURATION: 97 %

## 2023-05-25 DIAGNOSIS — G47.33 OBSTRUCTIVE SLEEP APNEA SYNDROME: ICD-10-CM

## 2023-05-25 DIAGNOSIS — M85.80 OSTEOPENIA, UNSPECIFIED LOCATION: ICD-10-CM

## 2023-05-25 DIAGNOSIS — D05.12 DUCTAL CARCINOMA IN SITU (DCIS) OF LEFT BREAST: Primary | ICD-10-CM

## 2023-05-25 DIAGNOSIS — I10 ESSENTIAL HYPERTENSION: ICD-10-CM

## 2023-05-25 PROCEDURE — 99214 OFFICE O/P EST MOD 30 MIN: CPT | Performed by: SURGERY

## 2023-05-25 PROCEDURE — 3017F COLORECTAL CA SCREEN DOC REV: CPT | Performed by: SURGERY

## 2023-05-25 PROCEDURE — 1123F ACP DISCUSS/DSCN MKR DOCD: CPT | Performed by: SURGERY

## 2023-05-25 PROCEDURE — 3078F DIAST BP <80 MM HG: CPT | Performed by: SURGERY

## 2023-05-25 PROCEDURE — 1036F TOBACCO NON-USER: CPT | Performed by: SURGERY

## 2023-05-25 PROCEDURE — G8427 DOCREV CUR MEDS BY ELIG CLIN: HCPCS | Performed by: SURGERY

## 2023-05-25 PROCEDURE — G8420 CALC BMI NORM PARAMETERS: HCPCS | Performed by: SURGERY

## 2023-05-25 PROCEDURE — 3074F SYST BP LT 130 MM HG: CPT | Performed by: SURGERY

## 2023-05-25 PROCEDURE — 1090F PRES/ABSN URINE INCON ASSESS: CPT | Performed by: SURGERY

## 2023-05-25 PROCEDURE — G8400 PT W/DXA NO RESULTS DOC: HCPCS | Performed by: SURGERY

## 2023-05-25 NOTE — PROGRESS NOTES
Respiratory:  Positive for apnea. Negative for cough, shortness of breath and wheezing. Sleep apnea. Utilizes CPAP   Cardiovascular:  Negative for chest pain and palpitations. Gastrointestinal:  Negative for abdominal pain, blood in stool, nausea and vomiting. Endocrine: Negative for cold intolerance, heat intolerance and polydipsia. Genitourinary:  Negative for dysuria, flank pain, hematuria and vaginal bleeding. Musculoskeletal:  Negative for gait problem, joint swelling and myalgias. Skin:  Negative for color change and rash. Allergic/Immunologic: Positive for environmental allergies. Negative for immunocompromised state. Neurological:  Negative for dizziness, tremors, seizures and speech difficulty. Hematological:  Negative for adenopathy. Does not bruise/bleed easily. Psychiatric/Behavioral:  Negative for behavioral problems and confusion. OBJECTIVE     BP (!) 142/80 (Site: Right Upper Arm, Position: Sitting, Cuff Size: Medium Adult)   Pulse 65   Temp 97.1 °F (36.2 °C) (Temporal)   Resp 18   Ht 5' 8\" (1.727 m)   Wt 149 lb (67.6 kg)   LMP 03/01/1996 (Approximate)   SpO2 97%   BMI 22.66 kg/m²      Physical Exam  Constitutional:       Appearance: Normal appearance. She is well-developed. She is not ill-appearing or diaphoretic. HENT:      Head: Normocephalic and atraumatic. Nose: Nose normal. No congestion or rhinorrhea. Eyes:      General: No scleral icterus. Extraocular Movements: Extraocular movements intact. Neck:      Vascular: No JVD. Trachea: No tracheal deviation. Cardiovascular:      Rate and Rhythm: Normal rate. Heart sounds: Normal heart sounds. No murmur heard. Pulmonary:      Effort: Pulmonary effort is normal. No respiratory distress. Breath sounds: Normal breath sounds. Chest:      Chest wall: No mass, swelling or tenderness. Breasts:     Right: No inverted nipple, mass, nipple discharge or skin change.       Left: No

## 2023-05-27 ASSESSMENT — ENCOUNTER SYMPTOMS
COLOR CHANGE: 0
BLOOD IN STOOL: 0
WHEEZING: 0
VOICE CHANGE: 0
SORE THROAT: 0
TROUBLE SWALLOWING: 0
NAUSEA: 0
ABDOMINAL PAIN: 0
COUGH: 0
SHORTNESS OF BREATH: 0
VOMITING: 0
APNEA: 1

## 2023-10-23 ENCOUNTER — HOSPITAL ENCOUNTER (OUTPATIENT)
Dept: WOMENS IMAGING | Age: 78
Discharge: HOME | End: 2023-10-23
Payer: MEDICARE

## 2023-10-23 DIAGNOSIS — C50.512 BILATERAL MALIGNANT NEOPLASM OF LOWER OUTER QUADRANT OF BREAST IN FEMALE, UNSPECIFIED ESTROGEN RECEPTOR STATUS (HCC): ICD-10-CM

## 2023-10-23 DIAGNOSIS — C50.412 MALIGNANT NEOPLASM OF UPPER-OUTER QUADRANT OF LEFT FEMALE BREAST, UNSPECIFIED ESTROGEN RECEPTOR STATUS (HCC): ICD-10-CM

## 2023-10-23 PROCEDURE — G0279 TOMOSYNTHESIS, MAMMO: HCPCS

## 2023-11-16 ENCOUNTER — OFFICE VISIT (OUTPATIENT)
Dept: SURGERY | Age: 78
End: 2023-11-16
Payer: MEDICARE

## 2023-11-16 VITALS
HEIGHT: 68 IN | HEART RATE: 67 BPM | BODY MASS INDEX: 23.46 KG/M2 | WEIGHT: 154.8 LBS | SYSTOLIC BLOOD PRESSURE: 140 MMHG | RESPIRATION RATE: 16 BRPM | DIASTOLIC BLOOD PRESSURE: 64 MMHG | TEMPERATURE: 97.5 F | OXYGEN SATURATION: 97 %

## 2023-11-16 DIAGNOSIS — I10 ESSENTIAL HYPERTENSION: ICD-10-CM

## 2023-11-16 DIAGNOSIS — M85.80 OSTEOPENIA, UNSPECIFIED LOCATION: ICD-10-CM

## 2023-11-16 DIAGNOSIS — D05.12 DUCTAL CARCINOMA IN SITU (DCIS) OF LEFT BREAST: Primary | ICD-10-CM

## 2023-11-16 PROCEDURE — G8427 DOCREV CUR MEDS BY ELIG CLIN: HCPCS | Performed by: SURGERY

## 2023-11-16 PROCEDURE — 99214 OFFICE O/P EST MOD 30 MIN: CPT | Performed by: SURGERY

## 2023-11-16 PROCEDURE — 3074F SYST BP LT 130 MM HG: CPT | Performed by: SURGERY

## 2023-11-16 PROCEDURE — 1036F TOBACCO NON-USER: CPT | Performed by: SURGERY

## 2023-11-16 PROCEDURE — 1090F PRES/ABSN URINE INCON ASSESS: CPT | Performed by: SURGERY

## 2023-11-16 PROCEDURE — 3078F DIAST BP <80 MM HG: CPT | Performed by: SURGERY

## 2023-11-16 PROCEDURE — G8400 PT W/DXA NO RESULTS DOC: HCPCS | Performed by: SURGERY

## 2023-11-16 PROCEDURE — 1123F ACP DISCUSS/DSCN MKR DOCD: CPT | Performed by: SURGERY

## 2023-11-16 PROCEDURE — G8420 CALC BMI NORM PARAMETERS: HCPCS | Performed by: SURGERY

## 2023-11-16 PROCEDURE — G8484 FLU IMMUNIZE NO ADMIN: HCPCS | Performed by: SURGERY

## 2023-11-16 ASSESSMENT — ENCOUNTER SYMPTOMS
COUGH: 0
SORE THROAT: 0
NAUSEA: 0
WHEEZING: 0
TROUBLE SWALLOWING: 0
COLOR CHANGE: 0
VOMITING: 0
APNEA: 1
BLOOD IN STOOL: 0
VOICE CHANGE: 0
SHORTNESS OF BREATH: 0
ABDOMINAL PAIN: 0

## 2023-11-16 NOTE — PROGRESS NOTES
INFORMATION: Malignant neoplasm of upper-outer quadrant of left female breast, unspecified estrogen receptor status ([address]) . Tomosynthesis. Mike Ayala PATIENT MEDICAL HISTORY:  No relevant medical history has been documented for this patient. FAMILY HISTORY:   No relevant family history has been documented for this patient. RISK VALUES: Daniel-Maura 10yr.: n/a%, Antoinette life:   n/a%        COMPARISON: 4/19/2023, 11/18/2022, 11/1/2022, 10/24/2022, 4/20/2022, 4/18/2022, 3/8/2021, 3/5/2020, 3/1/2019, 3/1/2018     TECHNIQUE: Bilateral CC and MLO views were obtained each breast. Tomosynthesis was used. CAD was used. BREAST COMPOSITION: The tissue of the breast(s) is heterogeneously dense. This may lower the sensitivity of mammography. FINDINGS:      Postoperative findings within the left breast are noted. No significant masses, calcifications, or other findings are seen in the breast(s). Signed by Lina Brumfield MD on 10/24/2023 23:35  Narrative & Impression  LOCATION: [place]     PROCEDURE: LOIDA BRITTANY DIGITAL DIAGNOSTIC UNILATERAL LEFT     CLINICAL INFORMATION: Malignant neoplasm of upper-outer quadrant of left female breast, unspecified estrogen receptor status ([address]) . Tomosynthesis. Mike Ayala PATIENT MEDICAL HISTORY:  No relevant medical history has been documented for this patient. FAMILY HISTORY:   No relevant family history has been documented for this patient. RISK VALUES: Daniel-Maura 10yr.: n/a%, Antoinette life:   n/a%        COMPARISON: 4/19/2023, 11/18/2022, 11/1/2022, 10/24/2022, 4/20/2022, 4/18/2022, 3/8/2021, 3/5/2020, 3/1/2019, 3/1/2018     TECHNIQUE: Bilateral CC and MLO views were obtained each breast. Tomosynthesis was used. CAD was used. BREAST COMPOSITION: The tissue of the breast(s) is heterogeneously dense. This may lower the sensitivity of mammography. FINDINGS:      Postoperative findings within the left breast are noted.  No significant masses, calcifications, or other

## 2024-04-23 ENCOUNTER — HOSPITAL ENCOUNTER (OUTPATIENT)
Dept: WOMENS IMAGING | Age: 79
Discharge: HOME | End: 2024-04-23
Attending: INTERNAL MEDICINE
Payer: MEDICARE

## 2024-04-23 DIAGNOSIS — C50.511 MALIGNANT NEOPLASM OF LOWER-OUTER QUADRANT OF RIGHT FEMALE BREAST, UNSPECIFIED ESTROGEN RECEPTOR STATUS (HCC): ICD-10-CM

## 2024-04-23 DIAGNOSIS — C50.412 MALIGNANT NEOPLASM OF UPPER-OUTER QUADRANT OF LEFT FEMALE BREAST, UNSPECIFIED ESTROGEN RECEPTOR STATUS (HCC): ICD-10-CM

## 2024-04-23 PROCEDURE — G0279 TOMOSYNTHESIS, MAMMO: HCPCS

## 2024-05-03 ENCOUNTER — TELEPHONE (OUTPATIENT)
Dept: SURGERY | Age: 79
End: 2024-05-03

## 2024-05-03 NOTE — TELEPHONE ENCOUNTER
Left voicemail;  appt on 05/16/24 needs either moved to the morning or another day as Dr. Erickson is now out in the morning.

## 2024-05-13 PROBLEM — M85.89 OSTEOPENIA OF MULTIPLE SITES: Status: ACTIVE | Noted: 2021-05-25

## 2024-05-13 PROBLEM — Z96.641 HISTORY OF TOTAL RIGHT HIP REPLACEMENT: Status: ACTIVE | Noted: 2023-08-23

## 2024-05-13 PROBLEM — I10 ESSENTIAL HYPERTENSION: Status: ACTIVE | Noted: 2021-05-25

## 2024-05-13 PROBLEM — K21.9 GERD (GASTROESOPHAGEAL REFLUX DISEASE): Status: ACTIVE | Noted: 2024-05-13

## 2024-05-13 PROBLEM — L40.0 PLAQUE PSORIASIS: Status: ACTIVE | Noted: 2019-05-23

## 2024-05-23 ENCOUNTER — OFFICE VISIT (OUTPATIENT)
Dept: SURGERY | Age: 79
End: 2024-05-23
Payer: MEDICARE

## 2024-05-23 VITALS
SYSTOLIC BLOOD PRESSURE: 132 MMHG | HEIGHT: 68 IN | WEIGHT: 154.2 LBS | OXYGEN SATURATION: 94 % | TEMPERATURE: 97.6 F | DIASTOLIC BLOOD PRESSURE: 84 MMHG | RESPIRATION RATE: 18 BRPM | BODY MASS INDEX: 23.37 KG/M2 | HEART RATE: 79 BPM

## 2024-05-23 DIAGNOSIS — I10 ESSENTIAL HYPERTENSION: ICD-10-CM

## 2024-05-23 DIAGNOSIS — M85.80 OSTEOPENIA, UNSPECIFIED LOCATION: ICD-10-CM

## 2024-05-23 DIAGNOSIS — D05.12 DUCTAL CARCINOMA IN SITU (DCIS) OF LEFT BREAST: Primary | ICD-10-CM

## 2024-05-23 PROCEDURE — 1123F ACP DISCUSS/DSCN MKR DOCD: CPT | Performed by: SURGERY

## 2024-05-23 PROCEDURE — G8420 CALC BMI NORM PARAMETERS: HCPCS | Performed by: SURGERY

## 2024-05-23 PROCEDURE — 1036F TOBACCO NON-USER: CPT | Performed by: SURGERY

## 2024-05-23 PROCEDURE — 3075F SYST BP GE 130 - 139MM HG: CPT | Performed by: SURGERY

## 2024-05-23 PROCEDURE — 99214 OFFICE O/P EST MOD 30 MIN: CPT | Performed by: SURGERY

## 2024-05-23 PROCEDURE — G8427 DOCREV CUR MEDS BY ELIG CLIN: HCPCS | Performed by: SURGERY

## 2024-05-23 PROCEDURE — 1090F PRES/ABSN URINE INCON ASSESS: CPT | Performed by: SURGERY

## 2024-05-23 PROCEDURE — G8399 PT W/DXA RESULTS DOCUMENT: HCPCS | Performed by: SURGERY

## 2024-05-23 PROCEDURE — 3079F DIAST BP 80-89 MM HG: CPT | Performed by: SURGERY

## 2024-05-23 ASSESSMENT — ENCOUNTER SYMPTOMS
NAUSEA: 0
VOMITING: 0
WHEEZING: 0
ABDOMINAL PAIN: 0
SHORTNESS OF BREATH: 0
COLOR CHANGE: 0
COUGH: 0
BLOOD IN STOOL: 0
SORE THROAT: 0
VOICE CHANGE: 0
APNEA: 1
TROUBLE SWALLOWING: 0

## 2024-05-23 NOTE — PROGRESS NOTES
11/1/2022, 10/24/2022, 4/20/2022, 4/18/2022, 3/8/2021, 3/5/2020, 3/1/2019, 3/1/2018     TECHNIQUE: Bilateral CC and MLO views were obtained each breast. Tomosynthesis was used. CAD was used.      BREAST COMPOSITION: The tissue of the breast(s) is heterogeneously dense. This may lower the sensitivity of mammography.     FINDINGS:      Postoperative findings within the left breast are noted. No significant masses, calcifications, or other findings are seen in the breast(s).     IMPRESSION:  No mammographic evidence of malignancy. Return to yearly screening mammography is recommended.     A result letter will be sent to the patient.  She will also receive a reminder 1 month prior to her next mammogram.     Given the finding of dense breast tissue, this patient is a candidate for automated whole breast screening ultrasound (ABUS) to aid in breast cancer detection.     BI-RADS CATEGORY 2: BENIGN FINDINGS.     Management Recommendation: Routine annual mammography.           **This report has been created using voice recognition software. It may contain minor errors which are inherent in voice recognition technology.**     Final report electronically signed by Dr. Cisco Frye on 10/23/2023 5:38 PM        Has order for left mammo in October  Sinus  Taking ca mg d3

## 2024-10-14 ENCOUNTER — OFFICE VISIT (OUTPATIENT)
Dept: SURGERY | Age: 79
End: 2024-10-14
Payer: MEDICARE

## 2024-10-14 VITALS
HEIGHT: 68 IN | OXYGEN SATURATION: 94 % | BODY MASS INDEX: 23.34 KG/M2 | SYSTOLIC BLOOD PRESSURE: 146 MMHG | WEIGHT: 154 LBS | HEART RATE: 70 BPM | DIASTOLIC BLOOD PRESSURE: 70 MMHG | TEMPERATURE: 97.1 F

## 2024-10-14 DIAGNOSIS — I10 ESSENTIAL HYPERTENSION: ICD-10-CM

## 2024-10-14 DIAGNOSIS — M85.80 OSTEOPENIA, UNSPECIFIED LOCATION: ICD-10-CM

## 2024-10-14 DIAGNOSIS — D05.12 DUCTAL CARCINOMA IN SITU (DCIS) OF LEFT BREAST: Primary | ICD-10-CM

## 2024-10-14 PROCEDURE — 99214 OFFICE O/P EST MOD 30 MIN: CPT | Performed by: SURGERY

## 2024-10-14 PROCEDURE — G8399 PT W/DXA RESULTS DOCUMENT: HCPCS | Performed by: SURGERY

## 2024-10-14 PROCEDURE — G8420 CALC BMI NORM PARAMETERS: HCPCS | Performed by: SURGERY

## 2024-10-14 PROCEDURE — G8427 DOCREV CUR MEDS BY ELIG CLIN: HCPCS | Performed by: SURGERY

## 2024-10-14 PROCEDURE — 1036F TOBACCO NON-USER: CPT | Performed by: SURGERY

## 2024-10-14 PROCEDURE — 3077F SYST BP >= 140 MM HG: CPT | Performed by: SURGERY

## 2024-10-14 PROCEDURE — 3078F DIAST BP <80 MM HG: CPT | Performed by: SURGERY

## 2024-10-14 PROCEDURE — 1123F ACP DISCUSS/DSCN MKR DOCD: CPT | Performed by: SURGERY

## 2024-10-14 PROCEDURE — G8484 FLU IMMUNIZE NO ADMIN: HCPCS | Performed by: SURGERY

## 2024-10-14 PROCEDURE — 1090F PRES/ABSN URINE INCON ASSESS: CPT | Performed by: SURGERY

## 2024-10-14 ASSESSMENT — ENCOUNTER SYMPTOMS
COUGH: 0
NAUSEA: 0
VOICE CHANGE: 0
SORE THROAT: 0
TROUBLE SWALLOWING: 0
COLOR CHANGE: 0
APNEA: 1
VOMITING: 0
ABDOMINAL PAIN: 0
SHORTNESS OF BREATH: 0
BLOOD IN STOOL: 0
WHEEZING: 0

## 2024-10-14 NOTE — PROGRESS NOTES
on file   Occupational History    Not on file   Tobacco Use    Smoking status: Never    Smokeless tobacco: Never   Vaping Use    Vaping status: Never Used   Substance and Sexual Activity    Alcohol use: Yes     Comment: rarely    Drug use: Never    Sexual activity: Not on file   Other Topics Concern    Not on file   Social History Narrative    Not on file     Social Determinants of Health     Financial Resource Strain: Low Risk  (6/25/2024)    Received from [facility]    Overall Financial Resource Strain (CARDIA)     Difficulty of Paying Living Expenses: Not hard at all   Food Insecurity: No Food Insecurity (6/25/2024)    Received from [facility]    Hunger Vital Sign     Worried About Running Out of Food in the Last Year: Never true     Ran Out of Food in the Last Year: Never true   Transportation Needs: No Transportation Needs (6/25/2024)    Received from [facility]    PRAPARE - Transportation     Lack of Transportation (Medical): No     Lack of Transportation (Non-Medical): No   Physical Activity: Sufficiently Active (6/25/2024)    Received from [facility]    Exercise Vital Sign     Days of Exercise per Week: 5 days     Minutes of Exercise per Session: 30 min   Stress: No Stress Concern Present (6/25/2024)    Received from [facility]    [employer] - Occupational Stress Questionnaire     Feeling of Stress : Not at all   Social Connections: Socially Integrated (6/25/2024)    Received from [facility]    Social Connection and Isolation Panel [NHANES]     Frequency of Communication with Friends and Family: More than three times a week     Frequency of Social Gatherings with Friends and Family: More than three times a week     Attends [employer] Services: More than 4 times per year

## 2024-10-24 ENCOUNTER — HOSPITAL ENCOUNTER (OUTPATIENT)
Dept: MRI IMAGING | Age: 79
Discharge: HOME | End: 2024-10-24
Attending: SURGERY
Payer: MEDICARE

## 2024-10-24 DIAGNOSIS — D05.12 DUCTAL CARCINOMA IN SITU (DCIS) OF LEFT BREAST: ICD-10-CM

## 2024-10-24 LAB — POC CREATININE WHOLE BLOOD: 0.8 MG/DL (ref 0.5–1.2)

## 2024-10-24 PROCEDURE — A9579 GAD-BASE MR CONTRAST NOS,1ML: HCPCS | Performed by: SURGERY

## 2024-10-24 PROCEDURE — 82565 ASSAY OF CREATININE: CPT

## 2024-10-24 PROCEDURE — C8908 MRI W/O FOL W/CONT, BREAST,: HCPCS

## 2024-10-24 PROCEDURE — 6360000004 HC RX CONTRAST MEDICATION: Performed by: SURGERY

## 2024-10-24 RX ADMIN — GADOTERIDOL 15 ML: 279.3 INJECTION, SOLUTION INTRAVENOUS at 11:54

## 2024-10-29 ENCOUNTER — TELEPHONE (OUTPATIENT)
Dept: SURGERY | Age: 79
End: 2024-10-29

## 2024-10-29 NOTE — TELEPHONE ENCOUNTER
I called pt in regards to mild hydronephrosis left kidney on MRI exam.  Pt is aware of this.  She states she had sinus cold that day and was coughing.  She states she will follow with her family doctor if she has any issues.

## 2025-04-29 ENCOUNTER — HOSPITAL ENCOUNTER (OUTPATIENT)
Dept: WOMENS IMAGING | Age: 80
Discharge: HOME | End: 2025-04-29
Attending: INTERNAL MEDICINE
Payer: MEDICARE

## 2025-04-29 DIAGNOSIS — Z17.0 MALIGNANT NEOPLASM OF LOWER-OUTER QUADRANT OF RIGHT BREAST OF FEMALE, ESTROGEN RECEPTOR POSITIVE (HCC): ICD-10-CM

## 2025-04-29 DIAGNOSIS — Z12.31 ENCOUNTER FOR SCREENING MAMMOGRAM FOR BREAST CANCER: ICD-10-CM

## 2025-04-29 DIAGNOSIS — C50.511 MALIGNANT NEOPLASM OF LOWER-OUTER QUADRANT OF RIGHT BREAST OF FEMALE, ESTROGEN RECEPTOR POSITIVE (HCC): ICD-10-CM

## 2025-04-29 PROCEDURE — 77063 BREAST TOMOSYNTHESIS BI: CPT

## (undated) DEVICE — Z INACTIVE SYRINGE BLB IRR

## (undated) DEVICE — SKIN AFFIX SURG ADHESIVE 72/CS 0.55ML: Brand: MEDLINE

## (undated) DEVICE — SHEETS DRAW

## (undated) DEVICE — SHEET, T, LAPAROTOMY, STERILE: Brand: MEDLINE

## (undated) DEVICE — GLOVE SURG SZ 7 L12IN FNGR THK94MIL TRNSLUC YEL LTX HYDRGEL

## (undated) DEVICE — SUTURE VCRL SZ 3-0 L27IN ABSRB UD L26MM SH 1/2 CIR J416H

## (undated) DEVICE — PACK PROCEDURE SURG PLAS SC MIN SRHP LF

## (undated) DEVICE — SUTURE MCRYL SZ 4-0 L27IN ABSRB UD L19MM PS-2 1/2 CIR PRIM Y426H

## (undated) DEVICE — TUBING, SUCTION, 1/4" X 12', STRAIGHT: Brand: MEDLINE

## (undated) DEVICE — SUTURE VCRL SZ 2-0 L27IN ABSRB UD L26MM SH 1/2 CIR J417H

## (undated) DEVICE — CHLORAPREP 26ML ORANGE

## (undated) DEVICE — APPLIER CLP L9.375IN APER 2.1MM CLS L3.8MM 20 SM TI CLP

## (undated) DEVICE — KENDALL SCD EXPRESS SLEEVES, KNEE LENGTH, MEDIUM: Brand: KENDALL SCD

## (undated) DEVICE — YANKAUER,BULB TIP,W/O VENT,RIGID,STERILE: Brand: MEDLINE